# Patient Record
Sex: MALE | Race: WHITE | NOT HISPANIC OR LATINO | Employment: UNEMPLOYED | URBAN - METROPOLITAN AREA
[De-identification: names, ages, dates, MRNs, and addresses within clinical notes are randomized per-mention and may not be internally consistent; named-entity substitution may affect disease eponyms.]

---

## 2024-09-09 ENCOUNTER — HOSPITAL ENCOUNTER (INPATIENT)
Facility: HOSPITAL | Age: 79
LOS: 2 days | Discharge: HOME | End: 2024-09-11
Attending: STUDENT IN AN ORGANIZED HEALTH CARE EDUCATION/TRAINING PROGRAM | Admitting: INTERNAL MEDICINE
Payer: MEDICARE

## 2024-09-09 ENCOUNTER — APPOINTMENT (OUTPATIENT)
Dept: RADIOLOGY | Facility: HOSPITAL | Age: 79
End: 2024-09-09
Payer: MEDICARE

## 2024-09-09 DIAGNOSIS — N20.0 NEPHROLITHIASIS: Primary | ICD-10-CM

## 2024-09-09 DIAGNOSIS — N17.9 AKI (ACUTE KIDNEY INJURY) (CMS-HCC): ICD-10-CM

## 2024-09-09 DIAGNOSIS — N39.0 URINARY TRACT INFECTION WITHOUT HEMATURIA, SITE UNSPECIFIED: ICD-10-CM

## 2024-09-09 PROBLEM — N13.9 OBSTRUCTIVE UROPATHY: Status: ACTIVE | Noted: 2024-09-09

## 2024-09-09 PROBLEM — R31.9 HEMATURIA: Status: ACTIVE | Noted: 2024-09-09

## 2024-09-09 PROBLEM — I71.43 ANEURYSM OF INFRARENAL ABDOMINAL AORTA (CMS-HCC): Status: ACTIVE | Noted: 2024-09-09

## 2024-09-09 PROBLEM — E78.5 DYSLIPIDEMIA: Status: ACTIVE | Noted: 2024-09-09

## 2024-09-09 PROBLEM — N40.0 BPH (BENIGN PROSTATIC HYPERPLASIA): Status: ACTIVE | Noted: 2024-09-09

## 2024-09-09 PROBLEM — I10 HYPERTENSION: Status: ACTIVE | Noted: 2024-09-09

## 2024-09-09 LAB
ALBUMIN SERPL-MCNC: 3.5 G/DL (ref 3.5–5)
ALP BLD-CCNC: 116 U/L (ref 35–125)
ALT SERPL-CCNC: 18 U/L (ref 5–40)
ANION GAP SERPL CALC-SCNC: 9 MMOL/L
APPEARANCE UR: ABNORMAL
AST SERPL-CCNC: 19 U/L (ref 5–40)
BACTERIA #/AREA URNS AUTO: ABNORMAL /HPF
BASOPHILS # BLD AUTO: 0.06 X10*3/UL (ref 0–0.1)
BASOPHILS NFR BLD AUTO: 0.3 %
BILIRUB SERPL-MCNC: 1 MG/DL (ref 0.1–1.2)
BILIRUB UR STRIP.AUTO-MCNC: NEGATIVE MG/DL
BUN SERPL-MCNC: 24 MG/DL (ref 8–25)
CALCIUM SERPL-MCNC: 9.9 MG/DL (ref 8.5–10.4)
CHLORIDE SERPL-SCNC: 98 MMOL/L (ref 97–107)
CO2 SERPL-SCNC: 22 MMOL/L (ref 24–31)
COLOR UR: YELLOW
CREAT SERPL-MCNC: 1.9 MG/DL (ref 0.4–1.6)
EGFRCR SERPLBLD CKD-EPI 2021: 36 ML/MIN/1.73M*2
EOSINOPHIL # BLD AUTO: 0.09 X10*3/UL (ref 0–0.4)
EOSINOPHIL NFR BLD AUTO: 0.5 %
ERYTHROCYTE [DISTWIDTH] IN BLOOD BY AUTOMATED COUNT: 12.5 % (ref 11.5–14.5)
GLUCOSE SERPL-MCNC: 127 MG/DL (ref 65–99)
GLUCOSE UR STRIP.AUTO-MCNC: NORMAL MG/DL
HCT VFR BLD AUTO: 36 % (ref 41–52)
HGB BLD-MCNC: 12.3 G/DL (ref 13.5–17.5)
HOLD SPECIMEN: NORMAL
IMM GRANULOCYTES # BLD AUTO: 0.1 X10*3/UL (ref 0–0.5)
IMM GRANULOCYTES NFR BLD AUTO: 0.5 % (ref 0–0.9)
KETONES UR STRIP.AUTO-MCNC: NEGATIVE MG/DL
LEUKOCYTE ESTERASE UR QL STRIP.AUTO: ABNORMAL
LIPASE SERPL-CCNC: 17 U/L (ref 16–63)
LYMPHOCYTES # BLD AUTO: 1.61 X10*3/UL (ref 0.8–3)
LYMPHOCYTES NFR BLD AUTO: 8.8 %
MCH RBC QN AUTO: 31.5 PG (ref 26–34)
MCHC RBC AUTO-ENTMCNC: 34.2 G/DL (ref 32–36)
MCV RBC AUTO: 92 FL (ref 80–100)
MONOCYTES # BLD AUTO: 1.83 X10*3/UL (ref 0.05–0.8)
MONOCYTES NFR BLD AUTO: 10 %
MUCOUS THREADS #/AREA URNS AUTO: ABNORMAL /LPF
NEUTROPHILS # BLD AUTO: 14.62 X10*3/UL (ref 1.6–5.5)
NEUTROPHILS NFR BLD AUTO: 79.9 %
NITRITE UR QL STRIP.AUTO: ABNORMAL
NRBC BLD-RTO: 0 /100 WBCS (ref 0–0)
PH UR STRIP.AUTO: 6 [PH]
PLATELET # BLD AUTO: 263 X10*3/UL (ref 150–450)
POTASSIUM SERPL-SCNC: 4.3 MMOL/L (ref 3.4–5.1)
PROT SERPL-MCNC: 6.8 G/DL (ref 5.9–7.9)
PROT UR STRIP.AUTO-MCNC: ABNORMAL MG/DL
RBC # BLD AUTO: 3.9 X10*6/UL (ref 4.5–5.9)
RBC # UR STRIP.AUTO: ABNORMAL /UL
RBC #/AREA URNS AUTO: >20 /HPF
SODIUM SERPL-SCNC: 129 MMOL/L (ref 133–145)
SP GR UR STRIP.AUTO: 1.02
UROBILINOGEN UR STRIP.AUTO-MCNC: NORMAL MG/DL
WBC # BLD AUTO: 18.3 X10*3/UL (ref 4.4–11.3)
WBC #/AREA URNS AUTO: >50 /HPF
WBC CLUMPS #/AREA URNS AUTO: ABNORMAL /HPF

## 2024-09-09 PROCEDURE — 1210000001 HC SEMI-PRIVATE ROOM DAILY

## 2024-09-09 PROCEDURE — 36415 COLL VENOUS BLD VENIPUNCTURE: CPT | Performed by: STUDENT IN AN ORGANIZED HEALTH CARE EDUCATION/TRAINING PROGRAM

## 2024-09-09 PROCEDURE — 85025 COMPLETE CBC W/AUTO DIFF WBC: CPT | Performed by: STUDENT IN AN ORGANIZED HEALTH CARE EDUCATION/TRAINING PROGRAM

## 2024-09-09 PROCEDURE — 99285 EMERGENCY DEPT VISIT HI MDM: CPT

## 2024-09-09 PROCEDURE — 74176 CT ABD & PELVIS W/O CONTRAST: CPT | Performed by: RADIOLOGY

## 2024-09-09 PROCEDURE — 2500000004 HC RX 250 GENERAL PHARMACY W/ HCPCS (ALT 636 FOR OP/ED): Performed by: STUDENT IN AN ORGANIZED HEALTH CARE EDUCATION/TRAINING PROGRAM

## 2024-09-09 PROCEDURE — 96365 THER/PROPH/DIAG IV INF INIT: CPT

## 2024-09-09 PROCEDURE — 81001 URINALYSIS AUTO W/SCOPE: CPT | Performed by: STUDENT IN AN ORGANIZED HEALTH CARE EDUCATION/TRAINING PROGRAM

## 2024-09-09 PROCEDURE — 74176 CT ABD & PELVIS W/O CONTRAST: CPT

## 2024-09-09 PROCEDURE — 2500000002 HC RX 250 W HCPCS SELF ADMINISTERED DRUGS (ALT 637 FOR MEDICARE OP, ALT 636 FOR OP/ED): Performed by: INTERNAL MEDICINE

## 2024-09-09 PROCEDURE — 80053 COMPREHEN METABOLIC PANEL: CPT | Performed by: STUDENT IN AN ORGANIZED HEALTH CARE EDUCATION/TRAINING PROGRAM

## 2024-09-09 PROCEDURE — 2500000004 HC RX 250 GENERAL PHARMACY W/ HCPCS (ALT 636 FOR OP/ED): Performed by: INTERNAL MEDICINE

## 2024-09-09 PROCEDURE — 99222 1ST HOSP IP/OBS MODERATE 55: CPT | Performed by: INTERNAL MEDICINE

## 2024-09-09 PROCEDURE — 83690 ASSAY OF LIPASE: CPT | Performed by: STUDENT IN AN ORGANIZED HEALTH CARE EDUCATION/TRAINING PROGRAM

## 2024-09-09 PROCEDURE — 87086 URINE CULTURE/COLONY COUNT: CPT | Mod: WESLAB | Performed by: STUDENT IN AN ORGANIZED HEALTH CARE EDUCATION/TRAINING PROGRAM

## 2024-09-09 PROCEDURE — 2500000001 HC RX 250 WO HCPCS SELF ADMINISTERED DRUGS (ALT 637 FOR MEDICARE OP): Performed by: INTERNAL MEDICINE

## 2024-09-09 RX ORDER — ACETAMINOPHEN 160 MG/5ML
650 SOLUTION ORAL EVERY 4 HOURS PRN
Status: DISCONTINUED | OUTPATIENT
Start: 2024-09-09 | End: 2024-09-12 | Stop reason: HOSPADM

## 2024-09-09 RX ORDER — LEVOTHYROXINE SODIUM 112 UG/1
112 TABLET ORAL DAILY
Status: DISCONTINUED | OUTPATIENT
Start: 2024-09-10 | End: 2024-09-12 | Stop reason: HOSPADM

## 2024-09-09 RX ORDER — METOPROLOL SUCCINATE 50 MG/1
50 TABLET, EXTENDED RELEASE ORAL DAILY
Status: DISCONTINUED | OUTPATIENT
Start: 2024-09-09 | End: 2024-09-12 | Stop reason: HOSPADM

## 2024-09-09 RX ORDER — ATORVASTATIN CALCIUM 40 MG/1
40 TABLET, FILM COATED ORAL DAILY
COMMUNITY

## 2024-09-09 RX ORDER — CLOPIDOGREL BISULFATE 75 MG/1
75 TABLET ORAL DAILY
Status: DISCONTINUED | OUTPATIENT
Start: 2024-09-09 | End: 2024-09-12 | Stop reason: HOSPADM

## 2024-09-09 RX ORDER — LEVOTHYROXINE SODIUM 112 UG/1
112 TABLET ORAL DAILY
COMMUNITY

## 2024-09-09 RX ORDER — PANTOPRAZOLE SODIUM 40 MG/10ML
40 INJECTION, POWDER, LYOPHILIZED, FOR SOLUTION INTRAVENOUS DAILY
Status: DISCONTINUED | OUTPATIENT
Start: 2024-09-09 | End: 2024-09-12 | Stop reason: HOSPADM

## 2024-09-09 RX ORDER — CLOPIDOGREL BISULFATE 75 MG/1
75 TABLET ORAL DAILY
COMMUNITY

## 2024-09-09 RX ORDER — LISINOPRIL 5 MG/1
5 TABLET ORAL DAILY
Status: DISCONTINUED | OUTPATIENT
Start: 2024-09-09 | End: 2024-09-12 | Stop reason: HOSPADM

## 2024-09-09 RX ORDER — ACETAMINOPHEN 650 MG/1
650 SUPPOSITORY RECTAL EVERY 4 HOURS PRN
Status: DISCONTINUED | OUTPATIENT
Start: 2024-09-09 | End: 2024-09-12 | Stop reason: HOSPADM

## 2024-09-09 RX ORDER — NITROGLYCERIN 0.4 MG/1
0.4 TABLET SUBLINGUAL EVERY 5 MIN PRN
Status: DISCONTINUED | OUTPATIENT
Start: 2024-09-09 | End: 2024-09-12 | Stop reason: HOSPADM

## 2024-09-09 RX ORDER — LANOLIN ALCOHOL/MO/W.PET/CERES
1000 CREAM (GRAM) TOPICAL DAILY
COMMUNITY

## 2024-09-09 RX ORDER — DEXTROSE MONOHYDRATE, SODIUM CHLORIDE, AND POTASSIUM CHLORIDE 50; 1.49; 4.5 G/1000ML; G/1000ML; G/1000ML
100 INJECTION, SOLUTION INTRAVENOUS CONTINUOUS
Status: DISCONTINUED | OUTPATIENT
Start: 2024-09-09 | End: 2024-09-10

## 2024-09-09 RX ORDER — ONDANSETRON 4 MG/1
4 TABLET, FILM COATED ORAL EVERY 8 HOURS PRN
Status: DISCONTINUED | OUTPATIENT
Start: 2024-09-09 | End: 2024-09-12 | Stop reason: HOSPADM

## 2024-09-09 RX ORDER — LANOLIN ALCOHOL/MO/W.PET/CERES
1000 CREAM (GRAM) TOPICAL DAILY
Status: DISCONTINUED | OUTPATIENT
Start: 2024-09-09 | End: 2024-09-12 | Stop reason: HOSPADM

## 2024-09-09 RX ORDER — CEFTRIAXONE 1 G/50ML
1 INJECTION, SOLUTION INTRAVENOUS ONCE
Status: COMPLETED | OUTPATIENT
Start: 2024-09-09 | End: 2024-09-09

## 2024-09-09 RX ORDER — GLUCOSAM/CHONDRO/HERB 149/HYAL 750-100 MG
1 TABLET ORAL 2 TIMES DAILY
Status: DISCONTINUED | OUTPATIENT
Start: 2024-09-09 | End: 2024-09-09

## 2024-09-09 RX ORDER — ASPIRIN 81 MG/1
81 TABLET ORAL DAILY
Status: DISCONTINUED | OUTPATIENT
Start: 2024-09-09 | End: 2024-09-12 | Stop reason: HOSPADM

## 2024-09-09 RX ORDER — ATORVASTATIN CALCIUM 40 MG/1
40 TABLET, FILM COATED ORAL NIGHTLY
Status: DISCONTINUED | OUTPATIENT
Start: 2024-09-09 | End: 2024-09-12 | Stop reason: HOSPADM

## 2024-09-09 RX ORDER — ZINC GLUCONATE 50 MG
50 TABLET ORAL DAILY
COMMUNITY

## 2024-09-09 RX ORDER — TAMSULOSIN HYDROCHLORIDE 0.4 MG/1
0.4 CAPSULE ORAL DAILY
Status: DISCONTINUED | OUTPATIENT
Start: 2024-09-09 | End: 2024-09-12 | Stop reason: HOSPADM

## 2024-09-09 RX ORDER — GUAIFENESIN/DEXTROMETHORPHAN 100-10MG/5
5 SYRUP ORAL EVERY 4 HOURS PRN
Status: DISCONTINUED | OUTPATIENT
Start: 2024-09-09 | End: 2024-09-12 | Stop reason: HOSPADM

## 2024-09-09 RX ORDER — ASCORBIC ACID 500 MG
500 TABLET ORAL DAILY
Status: DISCONTINUED | OUTPATIENT
Start: 2024-09-09 | End: 2024-09-12 | Stop reason: HOSPADM

## 2024-09-09 RX ORDER — NITROGLYCERIN 0.4 MG/1
0.4 TABLET SUBLINGUAL EVERY 5 MIN PRN
COMMUNITY

## 2024-09-09 RX ORDER — METOPROLOL SUCCINATE 50 MG/1
50 TABLET, EXTENDED RELEASE ORAL DAILY
COMMUNITY

## 2024-09-09 RX ORDER — GLUCOSAM/CHONDRO/HERB 149/HYAL 750-100 MG
1 TABLET ORAL 2 TIMES DAILY
COMMUNITY

## 2024-09-09 RX ORDER — ACETAMINOPHEN 325 MG/1
650 TABLET ORAL EVERY 4 HOURS PRN
Status: DISCONTINUED | OUTPATIENT
Start: 2024-09-09 | End: 2024-09-12 | Stop reason: HOSPADM

## 2024-09-09 RX ORDER — DOCUSATE SODIUM 100 MG/1
100 CAPSULE, LIQUID FILLED ORAL 2 TIMES DAILY
Status: DISCONTINUED | OUTPATIENT
Start: 2024-09-09 | End: 2024-09-12 | Stop reason: HOSPADM

## 2024-09-09 RX ORDER — PANTOPRAZOLE SODIUM 40 MG/1
40 TABLET, DELAYED RELEASE ORAL DAILY
Status: DISCONTINUED | OUTPATIENT
Start: 2024-09-09 | End: 2024-09-12 | Stop reason: HOSPADM

## 2024-09-09 RX ORDER — ASPIRIN 81 MG/1
81 TABLET ORAL DAILY
COMMUNITY

## 2024-09-09 RX ORDER — RAMIPRIL 2.5 MG/1
2.5 CAPSULE ORAL DAILY
COMMUNITY
End: 2024-09-11 | Stop reason: HOSPADM

## 2024-09-09 RX ORDER — ONDANSETRON HYDROCHLORIDE 2 MG/ML
4 INJECTION, SOLUTION INTRAVENOUS EVERY 8 HOURS PRN
Status: DISCONTINUED | OUTPATIENT
Start: 2024-09-09 | End: 2024-09-12 | Stop reason: HOSPADM

## 2024-09-09 RX ORDER — GUAIFENESIN 600 MG/1
600 TABLET, EXTENDED RELEASE ORAL EVERY 12 HOURS PRN
Status: DISCONTINUED | OUTPATIENT
Start: 2024-09-09 | End: 2024-09-12 | Stop reason: HOSPADM

## 2024-09-09 RX ORDER — POLYETHYLENE GLYCOL 3350 17 G/17G
17 POWDER, FOR SOLUTION ORAL DAILY
Status: DISCONTINUED | OUTPATIENT
Start: 2024-09-09 | End: 2024-09-12 | Stop reason: HOSPADM

## 2024-09-09 RX ORDER — ASCORBIC ACID 500 MG
500 TABLET ORAL DAILY
COMMUNITY

## 2024-09-09 RX ORDER — ENOXAPARIN SODIUM 100 MG/ML
40 INJECTION SUBCUTANEOUS DAILY
Status: DISCONTINUED | OUTPATIENT
Start: 2024-09-09 | End: 2024-09-12 | Stop reason: HOSPADM

## 2024-09-09 RX ORDER — TALC
3 POWDER (GRAM) TOPICAL NIGHTLY PRN
Status: DISCONTINUED | OUTPATIENT
Start: 2024-09-09 | End: 2024-09-12 | Stop reason: HOSPADM

## 2024-09-09 SDOH — SOCIAL STABILITY: SOCIAL INSECURITY: DO YOU FEEL ANYONE HAS EXPLOITED OR TAKEN ADVANTAGE OF YOU FINANCIALLY OR OF YOUR PERSONAL PROPERTY?: NO

## 2024-09-09 SDOH — SOCIAL STABILITY: SOCIAL INSECURITY
WITHIN THE LAST YEAR, HAVE TO BEEN RAPED OR FORCED TO HAVE ANY KIND OF SEXUAL ACTIVITY BY YOUR PARTNER OR EX-PARTNER?: NO

## 2024-09-09 SDOH — ECONOMIC STABILITY: FOOD INSECURITY: WITHIN THE PAST 12 MONTHS, YOU WORRIED THAT YOUR FOOD WOULD RUN OUT BEFORE YOU GOT MONEY TO BUY MORE.: NEVER TRUE

## 2024-09-09 SDOH — HEALTH STABILITY: PHYSICAL HEALTH: ON AVERAGE, HOW MANY MINUTES DO YOU ENGAGE IN EXERCISE AT THIS LEVEL?: 0 MIN

## 2024-09-09 SDOH — SOCIAL STABILITY: SOCIAL INSECURITY: WITHIN THE LAST YEAR, HAVE YOU BEEN AFRAID OF YOUR PARTNER OR EX-PARTNER?: NO

## 2024-09-09 SDOH — ECONOMIC STABILITY: INCOME INSECURITY: IN THE LAST 12 MONTHS, WAS THERE A TIME WHEN YOU WERE NOT ABLE TO PAY THE MORTGAGE OR RENT ON TIME?: NO

## 2024-09-09 SDOH — HEALTH STABILITY: MENTAL HEALTH: HOW OFTEN DO YOU HAVE 6 OR MORE DRINKS ON ONE OCCASION?: NEVER

## 2024-09-09 SDOH — HEALTH STABILITY: MENTAL HEALTH
HOW OFTEN DO YOU NEED TO HAVE SOMEONE HELP YOU WHEN YOU READ INSTRUCTIONS, PAMPHLETS, OR OTHER WRITTEN MATERIAL FROM YOUR DOCTOR OR PHARMACY?: NEVER

## 2024-09-09 SDOH — HEALTH STABILITY: MENTAL HEALTH: HOW OFTEN DO YOU HAVE A DRINK CONTAINING ALCOHOL?: NEVER

## 2024-09-09 SDOH — SOCIAL STABILITY: SOCIAL NETWORK: ARE YOU MARRIED, WIDOWED, DIVORCED, SEPARATED, NEVER MARRIED, OR LIVING WITH A PARTNER?: MARRIED

## 2024-09-09 SDOH — ECONOMIC STABILITY: TRANSPORTATION INSECURITY
IN THE PAST 12 MONTHS, HAS LACK OF TRANSPORTATION KEPT YOU FROM MEETINGS, WORK, OR FROM GETTING THINGS NEEDED FOR DAILY LIVING?: NO

## 2024-09-09 SDOH — ECONOMIC STABILITY: HOUSING INSECURITY: AT ANY TIME IN THE PAST 12 MONTHS, WERE YOU HOMELESS OR LIVING IN A SHELTER (INCLUDING NOW)?: NO

## 2024-09-09 SDOH — SOCIAL STABILITY: SOCIAL INSECURITY
WITHIN THE LAST YEAR, HAVE YOU BEEN KICKED, HIT, SLAPPED, OR OTHERWISE PHYSICALLY HURT BY YOUR PARTNER OR EX-PARTNER?: NO

## 2024-09-09 SDOH — SOCIAL STABILITY: SOCIAL INSECURITY: WITHIN THE LAST YEAR, HAVE YOU BEEN HUMILIATED OR EMOTIONALLY ABUSED IN OTHER WAYS BY YOUR PARTNER OR EX-PARTNER?: NO

## 2024-09-09 SDOH — SOCIAL STABILITY: SOCIAL INSECURITY: WERE YOU ABLE TO COMPLETE ALL THE BEHAVIORAL HEALTH SCREENINGS?: YES

## 2024-09-09 SDOH — SOCIAL STABILITY: SOCIAL NETWORK
IN A TYPICAL WEEK, HOW MANY TIMES DO YOU TALK ON THE PHONE WITH FAMILY, FRIENDS, OR NEIGHBORS?: MORE THAN THREE TIMES A WEEK

## 2024-09-09 SDOH — HEALTH STABILITY: MENTAL HEALTH
STRESS IS WHEN SOMEONE FEELS TENSE, NERVOUS, ANXIOUS, OR CAN'T SLEEP AT NIGHT BECAUSE THEIR MIND IS TROUBLED. HOW STRESSED ARE YOU?: NOT AT ALL

## 2024-09-09 SDOH — SOCIAL STABILITY: SOCIAL INSECURITY: DO YOU FEEL UNSAFE GOING BACK TO THE PLACE WHERE YOU ARE LIVING?: NO

## 2024-09-09 SDOH — SOCIAL STABILITY: SOCIAL INSECURITY: ABUSE: ADULT

## 2024-09-09 SDOH — SOCIAL STABILITY: SOCIAL NETWORK
DO YOU BELONG TO ANY CLUBS OR ORGANIZATIONS SUCH AS CHURCH GROUPS UNIONS, FRATERNAL OR ATHLETIC GROUPS, OR SCHOOL GROUPS?: NO

## 2024-09-09 SDOH — ECONOMIC STABILITY: FOOD INSECURITY: WITHIN THE PAST 12 MONTHS, THE FOOD YOU BOUGHT JUST DIDN'T LAST AND YOU DIDN'T HAVE MONEY TO GET MORE.: NEVER TRUE

## 2024-09-09 SDOH — SOCIAL STABILITY: SOCIAL INSECURITY: ARE THERE ANY APPARENT SIGNS OF INJURIES/BEHAVIORS THAT COULD BE RELATED TO ABUSE/NEGLECT?: NO

## 2024-09-09 SDOH — SOCIAL STABILITY: SOCIAL INSECURITY: ARE YOU OR HAVE YOU BEEN THREATENED OR ABUSED PHYSICALLY, EMOTIONALLY, OR SEXUALLY BY ANYONE?: NO

## 2024-09-09 SDOH — SOCIAL STABILITY: SOCIAL NETWORK: HOW OFTEN DO YOU ATTENT MEETINGS OF THE CLUB OR ORGANIZATION YOU BELONG TO?: NEVER

## 2024-09-09 SDOH — ECONOMIC STABILITY: INCOME INSECURITY: IN THE PAST 12 MONTHS, HAS THE ELECTRIC, GAS, OIL, OR WATER COMPANY THREATENED TO SHUT OFF SERVICE IN YOUR HOME?: NO

## 2024-09-09 SDOH — HEALTH STABILITY: PHYSICAL HEALTH: ON AVERAGE, HOW MANY DAYS PER WEEK DO YOU ENGAGE IN MODERATE TO STRENUOUS EXERCISE (LIKE A BRISK WALK)?: 0 DAYS

## 2024-09-09 SDOH — HEALTH STABILITY: MENTAL HEALTH: HOW MANY STANDARD DRINKS CONTAINING ALCOHOL DO YOU HAVE ON A TYPICAL DAY?: PATIENT DOES NOT DRINK

## 2024-09-09 SDOH — ECONOMIC STABILITY: INCOME INSECURITY: HOW HARD IS IT FOR YOU TO PAY FOR THE VERY BASICS LIKE FOOD, HOUSING, MEDICAL CARE, AND HEATING?: NOT VERY HARD

## 2024-09-09 SDOH — SOCIAL STABILITY: SOCIAL INSECURITY: HAVE YOU HAD THOUGHTS OF HARMING ANYONE ELSE?: NO

## 2024-09-09 SDOH — ECONOMIC STABILITY: TRANSPORTATION INSECURITY
IN THE PAST 12 MONTHS, HAS THE LACK OF TRANSPORTATION KEPT YOU FROM MEDICAL APPOINTMENTS OR FROM GETTING MEDICATIONS?: NO

## 2024-09-09 SDOH — SOCIAL STABILITY: SOCIAL INSECURITY: DOES ANYONE TRY TO KEEP YOU FROM HAVING/CONTACTING OTHER FRIENDS OR DOING THINGS OUTSIDE YOUR HOME?: NO

## 2024-09-09 SDOH — SOCIAL STABILITY: SOCIAL NETWORK: HOW OFTEN DO YOU GET TOGETHER WITH FRIENDS OR RELATIVES?: TWICE A WEEK

## 2024-09-09 SDOH — SOCIAL STABILITY: SOCIAL INSECURITY: HAVE YOU HAD ANY THOUGHTS OF HARMING ANYONE ELSE?: NO

## 2024-09-09 SDOH — ECONOMIC STABILITY: HOUSING INSECURITY: IN THE PAST 12 MONTHS, HOW MANY TIMES HAVE YOU MOVED WHERE YOU WERE LIVING?: 0

## 2024-09-09 SDOH — SOCIAL STABILITY: SOCIAL INSECURITY: HAS ANYONE EVER THREATENED TO HURT YOUR FAMILY OR YOUR PETS?: NO

## 2024-09-09 SDOH — SOCIAL STABILITY: SOCIAL NETWORK: HOW OFTEN DO YOU ATTEND CHURCH OR RELIGIOUS SERVICES?: NEVER

## 2024-09-09 ASSESSMENT — ENCOUNTER SYMPTOMS
SHORTNESS OF BREATH: 0
TREMORS: 0
NAUSEA: 0
CHEST TIGHTNESS: 0
DIAPHORESIS: 0
EYE DISCHARGE: 0
APPETITE CHANGE: 0
ABDOMINAL PAIN: 1
BLOOD IN STOOL: 0
WHEEZING: 0
CONSTIPATION: 0
STRIDOR: 0
PHOTOPHOBIA: 0
FREQUENCY: 0
NUMBNESS: 0
ARTHRALGIAS: 0
FACIAL ASYMMETRY: 0
FATIGUE: 0
COUGH: 0
CHILLS: 0
EYE ITCHING: 0
AGITATION: 0
DECREASED CONCENTRATION: 0
BRUISES/BLEEDS EASILY: 0
FEVER: 0
HEMATURIA: 1
UNEXPECTED WEIGHT CHANGE: 0
NERVOUS/ANXIOUS: 0
VOMITING: 0
HALLUCINATIONS: 0
DIARRHEA: 0
SPEECH DIFFICULTY: 0
EYE REDNESS: 0
WOUND: 0
APNEA: 0
CONFUSION: 0
LIGHT-HEADEDNESS: 0
VOICE CHANGE: 0
HYPERACTIVE: 0
POLYPHAGIA: 0
DIZZINESS: 0
COLOR CHANGE: 0
ANAL BLEEDING: 0
PALPITATIONS: 0
CHOKING: 0
SINUS PRESSURE: 0
DYSURIA: 0
FACIAL SWELLING: 0
NECK STIFFNESS: 0
SLEEP DISTURBANCE: 0
RECTAL PAIN: 0
TROUBLE SWALLOWING: 0
SORE THROAT: 0
JOINT SWELLING: 0
BACK PAIN: 0
EYE PAIN: 0
WEAKNESS: 0
POLYDIPSIA: 0
ABDOMINAL DISTENTION: 0
HEADACHES: 0
SINUS PAIN: 0
ADENOPATHY: 0
MYALGIAS: 0
FLANK PAIN: 0
NECK PAIN: 0
DIFFICULTY URINATING: 0
DYSPHORIC MOOD: 0
RHINORRHEA: 0
ACTIVITY CHANGE: 0
SEIZURES: 0

## 2024-09-09 ASSESSMENT — PAIN - FUNCTIONAL ASSESSMENT
PAIN_FUNCTIONAL_ASSESSMENT: 0-10
PAIN_FUNCTIONAL_ASSESSMENT: 0-10

## 2024-09-09 ASSESSMENT — COGNITIVE AND FUNCTIONAL STATUS - GENERAL
DAILY ACTIVITIY SCORE: 24
PATIENT BASELINE BEDBOUND: NO
MOBILITY SCORE: 24

## 2024-09-09 ASSESSMENT — LIFESTYLE VARIABLES
HOW OFTEN DO YOU HAVE 6 OR MORE DRINKS ON ONE OCCASION: NEVER
HOW OFTEN DO YOU HAVE A DRINK CONTAINING ALCOHOL: NEVER
AUDIT-C TOTAL SCORE: 0
SKIP TO QUESTIONS 9-10: 1
EVER FELT BAD OR GUILTY ABOUT YOUR DRINKING: NO
AUDIT-C TOTAL SCORE: 0
AUDIT-C TOTAL SCORE: 0
PRESCIPTION_ABUSE_PAST_12_MONTHS: NO
HOW MANY STANDARD DRINKS CONTAINING ALCOHOL DO YOU HAVE ON A TYPICAL DAY: PATIENT DOES NOT DRINK
SUBSTANCE_ABUSE_PAST_12_MONTHS: NO
EVER HAD A DRINK FIRST THING IN THE MORNING TO STEADY YOUR NERVES TO GET RID OF A HANGOVER: NO
HAVE YOU EVER FELT YOU SHOULD CUT DOWN ON YOUR DRINKING: NO
SKIP TO QUESTIONS 9-10: 1
HAVE PEOPLE ANNOYED YOU BY CRITICIZING YOUR DRINKING: NO
TOTAL SCORE: 0

## 2024-09-09 ASSESSMENT — ACTIVITIES OF DAILY LIVING (ADL)
HEARING - RIGHT EAR: FUNCTIONAL
WALKS IN HOME: INDEPENDENT
BATHING: INDEPENDENT
FEEDING YOURSELF: INDEPENDENT
ADEQUATE_TO_COMPLETE_ADL: YES
DRESSING YOURSELF: INDEPENDENT
JUDGMENT_ADEQUATE_SAFELY_COMPLETE_DAILY_ACTIVITIES: YES
GROOMING: INDEPENDENT
HEARING - LEFT EAR: FUNCTIONAL
PATIENT'S MEMORY ADEQUATE TO SAFELY COMPLETE DAILY ACTIVITIES?: YES
TOILETING: INDEPENDENT

## 2024-09-09 ASSESSMENT — PAIN SCALES - GENERAL
PAINLEVEL_OUTOF10: 0 - NO PAIN
PAINLEVEL_OUTOF10: 3

## 2024-09-09 ASSESSMENT — PATIENT HEALTH QUESTIONNAIRE - PHQ9
1. LITTLE INTEREST OR PLEASURE IN DOING THINGS: NOT AT ALL
2. FEELING DOWN, DEPRESSED OR HOPELESS: NOT AT ALL
SUM OF ALL RESPONSES TO PHQ9 QUESTIONS 1 & 2: 0

## 2024-09-09 ASSESSMENT — PAIN DESCRIPTION - LOCATION: LOCATION: BACK

## 2024-09-09 ASSESSMENT — PAIN DESCRIPTION - ORIENTATION: ORIENTATION: RIGHT;LEFT;LOWER

## 2024-09-09 ASSESSMENT — COLUMBIA-SUICIDE SEVERITY RATING SCALE - C-SSRS
6. HAVE YOU EVER DONE ANYTHING, STARTED TO DO ANYTHING, OR PREPARED TO DO ANYTHING TO END YOUR LIFE?: NO
1. IN THE PAST MONTH, HAVE YOU WISHED YOU WERE DEAD OR WISHED YOU COULD GO TO SLEEP AND NOT WAKE UP?: NO
2. HAVE YOU ACTUALLY HAD ANY THOUGHTS OF KILLING YOURSELF?: NO

## 2024-09-09 ASSESSMENT — PAIN DESCRIPTION - PAIN TYPE: TYPE: ACUTE PAIN

## 2024-09-09 NOTE — ED TRIAGE NOTES
Pt c/o flank pain that started around Saturday that sometimes radiates to his abdomen. Pt stated he woke up this morning with hematuria and what appeared to be a clot in his urine as well.

## 2024-09-09 NOTE — ED PROVIDER NOTES
HPI   Chief Complaint   Patient presents with    Flank Pain       HPI  See [facility]      Patient History   No past medical history on file.  No past surgical history on file.  No family history on file.  Social History     Tobacco Use    Smoking status: Not on file    Smokeless tobacco: Not on file   Substance Use Topics    Alcohol use: Not on file    Drug use: Not on file       Physical Exam   ED Triage Vitals [09/09/24 0925]   Temperature Heart Rate Respirations BP   36.6 °C (97.9 °F) 82 16 114/62      Pulse Ox Temp Source Heart Rate Source Patient Position   98 % Temporal Monitor Sitting      BP Location FiO2 (%)     Left arm --       Physical Exam  See [facility]    ED Course & [facility]   ED Course as of 09/09/24 1054   Mon Sep 09, 2024   1027 June 7, 2024 creatinine was 1.1. [DH]      ED Course User Index  [DH] Merlin Friedman MD         Diagnoses as of 09/09/24 1054   Nephrolithiasis   Urinary tract infection without hematuria, site unspecified   HERRERA (acute kidney injury) (CMS-[facility])                 No data recorded     [place] Coma Scale Score: 15 (09/09/24 0930 : Raquel Glasgow RN)                           Medical Decision Making  78-year-old male with past medical history of hypertension, hyperlipidemia, ACS who presents emergency room with flank pain.  Patient notes bilateral flank pain has been going on for last several days.  Patient did note hematuria as well as a blood clot when urinating today but is still making urine.  He otherwise denies any fevers, chills, nausea, vomiting, chest pain, shortness of breath, abdominal pain, diarrhea or constipation    ED Triage Vitals [09/09/24 0925]   Temperature Heart Rate Respirations BP   36.6 °C (97.9 °F) 82 16 114/62      Pulse Ox Temp Source Heart Rate Source Patient Position   98 % Temporal Monitor Sitting      BP Location FiO2 (%)     Left arm --       Vital signs reviewed: Afebrile, nontachycardic, normotensive, 98% on room air    Exam     Constitutional: No acute distress.  Resting comfortably.   Head: Normocephalic, atraumatic.   Eyes: Pupils equal bilaterally, EOM grossly intact, conjunctiva normal.  Mouth/Throat: Oropharynx is clear, moist mucus membranes.   Neck: Supple. No lymphadenopathy.  Cardiovascular: Regular rate and regular rhythm. Extremities are well-perfused.   Pulmonary/Chest: No respiratory distress, breathing comfortably on room air.    Abdominal: Soft, non-tender, non-distended. No rebound or guarding.   Musculoskeletal: No lower extremity edema.     No CVA tenderness.  Skin: Warm, dry, and intact.   Neurological: Patient is oriented to person, place, time, and situation. Face symmetric, hearing intact to voice, speech normal. Moves all extremities.       Differential includes but is not limited to:  UTI versus pyelonephritis versus nephrolithiasis versus pancreatitis      Labs: ordered.  Labs Reviewed   CBC WITH AUTO DIFFERENTIAL - Abnormal       Result Value    WBC 18.3 (*)     nRBC 0.0      RBC 3.90 (*)     Hemoglobin 12.3 (*)     Hematocrit 36.0 (*)     MCV 92      MCH 31.5      MCHC 34.2      RDW 12.5      Platelets 263      Neutrophils % 79.9      Immature Granulocytes %, Automated 0.5      Lymphocytes % 8.8      Monocytes % 10.0      Eosinophils % 0.5      Basophils % 0.3      Neutrophils Absolute 14.62 (*)     Immature Granulocytes Absolute, Automated 0.10      Lymphocytes Absolute 1.61      Monocytes Absolute 1.83 (*)     Eosinophils Absolute 0.09      Basophils Absolute 0.06     URINALYSIS WITH REFLEX CULTURE AND MICROSCOPIC - Abnormal    Color, Urine Yellow      Appearance, Urine Turbid (*)     Specific Gravity, Urine 1.016      pH, Urine 6.0      Protein, Urine 30 (1+) (*)     Glucose, Urine Normal      Blood, Urine 1.0 (3+) (*)     Ketones, Urine NEGATIVE      Bilirubin, Urine NEGATIVE      Urobilinogen, Urine Normal      Nitrite, Urine 1+ (*)     Leukocyte Esterase, Urine 500 Marion/µL (*)    COMPREHENSIVE METABOLIC PANEL - Abnormal    Glucose 127 (*)      Sodium 129 (*)     Potassium 4.3      Chloride 98      Bicarbonate 22 (*)     Urea Nitrogen 24      Creatinine 1.90 (*)     eGFR 36 (*)     Calcium 9.9      Albumin 3.5      Alkaline Phosphatase 116      Total Protein 6.8      AST 19      Bilirubin, Total 1.0      ALT 18      Anion Gap 9     MICROSCOPIC ONLY, URINE - Abnormal    WBC, Urine >50 (*)     WBC Clumps, Urine MODERATE      RBC, Urine >20 (*)     Bacteria, Urine 1+ (*)     Mucus, Urine FEW     LIPASE - Normal    Lipase 17     URINE CULTURE   URINALYSIS WITH REFLEX CULTURE AND MICROSCOPIC    Narrative:     The following orders were created for panel order Urinalysis with Reflex Culture and Microscopic.  Procedure                               Abnormality         Status                     ---------                               -----------         ------                     Urinalysis with Reflex C...[[id number]]  Abnormal            Final result               Extra Urine Gray Tube[[id number]]                            In process                   Please view results for these tests on the individual orders.   EXTRA URINE GRAY TUBE       Radiology: ordered and independent interpretation performed.  CT Scan(s) CT abdomen pelvis is interpreted by me shows no large dilated loops of bowel distress bowel obstruction    ECG/medicine tests: ordered and independent interpretation performed.  ED Course as of 09/09/24 1054   Mon Sep 09, 2024   1027 June 7, 2024 creatinine was 1.1. [DH]      ED Course User Index  [DH] Merlin Friedman MD         Diagnoses as of 09/09/24 1054   Nephrolithiasis   Urinary tract infection without hematuria, site unspecified   HERRERA (acute kidney injury) (CMS-[facility])     Lab work as interpreted by me shows no significant anemia but does show leukocytosis.  CMP otherwise shows mild hyponatremia and HERRERA.  Creatinine today is 1.9 and June 7 this year was 1.1.  UA also demonstrates evidence of UTI, will treat with Rocephin at this time.  Patient is  otherwise hemodynamically stable and otherwise not tachycardic and nontoxic-appearing.  CT abdomen pelvis does demonstrate possible right-sided kidney stone with mild to moderate hydronephrosis.  Patient to be admitted for treatment of UTI and nephrolithiasis and seen by urology.    Discussion of management or test interpretation with external provider(s):  I personally discussed the patient's management with Dr. Rodriguez, hospitalist, who will admit patient at this time for further management.    ED Medications managed:    Medications   cefTRIAXone (Rocephin) 1 g in dextrose (iso) IV 50 mL (has no administration in time range)           Merlin Friedman MD  10:54 AM    Attending Emergency Physician  [facility] EMERGENCY MEDICINE            Procedure  Procedures     Merlin Friedman MD  09/09/24 6453

## 2024-09-09 NOTE — ED NOTES
Attempted to give pt his medications, states he needs to eat with them. Menu given to pt to order a tray and will try again when food comes.      Deanna العراقي RN  09/09/24 6524

## 2024-09-09 NOTE — PROGRESS NOTES
Pharmacy Medication History Review    Jaswinder Keenan is a 78 y.o. male. Pharmacy reviewed the patient's goizj-tw-[id number] medications and allergies for accuracy.    Medications ADDED:  Vitamin C 500mg  Aspirin 81mg  Atorvastatin 40mg  Clopidogrel 75mg  Vitamin B-12 1000mcg  Fish oil 1000  Levothyroxine 112mcg  Nitroglycerin 0.4  Ramipril 2.5mg  Zinc 50  Metoprolol  succinate 50mg  Preservision AREDS  Medications CHANGED:  none  Medications REMOVED:   None      The list below reflects the updated PTA list. Comments regarding how patient may be taking medications differently can be found in the Admit Orders Activity  Prior to Admission Medications   Prescriptions Last Dose Informant   ascorbic acid (Vitamin C) 500 mg tablet 9/8/2024 at aspirin Self   Sig: Take 1 tablet (500 mg) by mouth once daily.   aspirin 81 mg EC tablet 9/8/2024 Self   Sig: Take 1 tablet (81 mg) by mouth once daily.   atorvastatin (Lipitor) 40 mg tablet 9/8/2024 Self   Sig: Take 1 tablet (40 mg) by mouth once daily.   clopidogrel (Plavix) 75 mg tablet  Self   Sig: Take 1 tablet (75 mg) by mouth once daily.   cyanocobalamin (Vitamin B-12) 1,000 mcg tablet 9/8/2024 Self   Sig: Take 1 tablet (1,000 mcg) by mouth once daily.   levothyroxine (Synthroid, Levoxyl) 112 mcg tablet 9/9/2024 Self   Sig: Take 1 tablet (112 mcg) by mouth early in the morning.. Take on an empty stomach at the same time each day, either 30 to 60 minutes prior to breakfast   metoprolol succinate XL (Toprol-XL) 50 mg 24 hr tablet 9/8/2024 Self   Sig: Take 1 tablet (50 mg) by mouth once daily. Do not crush or chew.   nitroglycerin (Nitrostat) 0.4 mg SL tablet Unknown Self   Sig: Place 1 tablet (0.4 mg) under the tongue every 5 minutes if needed for chest pain.   omega 3-dha-epa-fish oil (Fish OiL) 1,000 mg (120 mg-180 mg) capsule 9/8/2024 Self   Sig: Take 1 capsule (1,000 mg) by mouth 2 times a day.   ramipril (Altace) 2.5 mg capsule 9/8/2024 Self   Sig: Take 1 capsule (2.5 mg)  by mouth once daily.   vit A/vit C/vit E/zinc/copper (PRESERVISION AREDS ORAL) 9/8/2024 Self   Sig: Take 1 each by mouth 2 times a day.   zinc gluconate 50 mg tablet 9/8/2024 Self   Sig: Take 1 tablet (50 mg of elemental zinc) by mouth once daily.      Facility-Administered Medications: None        The list below reflects the updated allergy list. Please review each documented allergy for additional clarification and justification.  Allergies  Reviewed by Joslyn Rios CPhT on 9/9/2024        Severity Reactions Comments    Penicillins Not Specified Hiv             [facility]    Sources used to complete the med history include historical progress notes, patient interview. Patient is a good historian.    Below are additional concerns with the patient's PTA list.  None     Joslyn Rios CPhT-Adv  Please reach out via [facility] Secure Chat for questions

## 2024-09-09 NOTE — CONSULTS
"Reason For Consult  Right renal calculus and UTI    History Of Present Illness  Jaswinder Keenan is a 78 y.o. male presenting with a 2 day history of pain in his back that radiates to his abdomen.  He has no prior history of stones.  This morning he noted some gross hematuria and a small clot.  He came to the ER where a CT scan showed a 14 mm stone in the right UPJ with moderate hydronephrosis.  His WBC was elevated at 18.3 with a left shift.  His UA showed 1+ bacteria and > 50 WBCs.  He has cultures pending and Ceftriaxone has been started.     Past Medical History  He has no past medical history on file.    Surgical History  He has no past surgical history on file.     Social History  He reports that he has been smoking cigarettes. He has a 50 pack-year smoking history. He has never used smokeless tobacco. No history on file for alcohol use and drug use.    Family History  No family history on file.     Allergies  Penicillins    Review of Systems  As per admission HPA     Physical Exam  Awake, alert and oriented.  Normal respiratory pattern  HEENT:  normal extraocular movements  Neck:  Supple  Abd:  Soft, non tender.  Extremities:  Moves all 4     Last Recorded Vitals  Blood pressure 122/57, pulse 73, temperature 36.6 °C (97.9 °F), temperature source Temporal, resp. rate 17, height 1.727 m (5' 8\"), weight 97.5 kg (215 lb), SpO2 95%.    Relevant Results      Scheduled medications  ascorbic acid, 500 mg, oral, Daily  aspirin, 81 mg, oral, Daily  atorvastatin, 40 mg, oral, Nightly  clopidogrel, 75 mg, oral, Daily  cyanocobalamin, 1,000 mcg, oral, Daily  docusate sodium, 100 mg, oral, BID  enoxaparin, 40 mg, subcutaneous, Daily  [START ON 9/10/2024] levothyroxine, 112 mcg, oral, Daily  lisinopril, 5 mg, oral, Daily  metoprolol succinate XL, 50 mg, oral, Daily  pantoprazole, 40 mg, oral, Daily   Or  pantoprazole, 40 mg, intravenous, Daily  polyethylene glycol, 17 g, oral, Daily  tamsulosin, 0.4 mg, oral, " Daily      Continuous medications  potassium vvuvzlw-T7-3.45%NaCl, 100 mL/hr      PRN medications  PRN medications: acetaminophen **OR** acetaminophen **OR** acetaminophen, benzocaine-menthol, dextromethorphan-guaifenesin, guaiFENesin, melatonin, nitroglycerin, ondansetron **OR** ondansetron  Results for orders placed or performed during the hospital encounter of 09/09/24 (from the past 24 hour(s))   CBC and Auto Differential   Result Value Ref Range    WBC 18.3 (H) 4.4 - 11.3 x10*3/uL    nRBC 0.0 0.0 - 0.0 /100 WBCs    RBC 3.90 (L) 4.50 - 5.90 x10*6/uL    Hemoglobin 12.3 (L) 13.5 - 17.5 g/dL    Hematocrit 36.0 (L) 41.0 - 52.0 %    MCV 92 80 - 100 fL    MCH 31.5 26.0 - 34.0 pg    MCHC 34.2 32.0 - 36.0 g/dL    RDW 12.5 11.5 - 14.5 %    Platelets 263 150 - 450 x10*3/uL    Neutrophils % 79.9 40.0 - 80.0 %    Immature Granulocytes %, Automated 0.5 0.0 - 0.9 %    Lymphocytes % 8.8 13.0 - 44.0 %    Monocytes % 10.0 2.0 - 10.0 %    Eosinophils % 0.5 0.0 - 6.0 %    Basophils % 0.3 0.0 - 2.0 %    Neutrophils Absolute 14.62 (H) 1.60 - 5.50 x10*3/uL    Immature Granulocytes Absolute, Automated 0.10 0.00 - 0.50 x10*3/uL    Lymphocytes Absolute 1.61 0.80 - 3.00 x10*3/uL    Monocytes Absolute 1.83 (H) 0.05 - 0.80 x10*3/uL    Eosinophils Absolute 0.09 0.00 - 0.40 x10*3/uL    Basophils Absolute 0.06 0.00 - 0.10 x10*3/uL   Urinalysis with Reflex Culture and Microscopic   Result Value Ref Range    Color, Urine Yellow Light-Yellow, Yellow, Dark-Yellow    Appearance, Urine Turbid (N) Clear    Specific Gravity, Urine 1.016 1.005 - 1.035    pH, Urine 6.0 5.0, 5.5, 6.0, 6.5, 7.0, 7.5, 8.0    Protein, Urine 30 (1+) (A) NEGATIVE, 10 (TRACE), 20 (TRACE) mg/dL    Glucose, Urine Normal Normal mg/dL    Blood, Urine 1.0 (3+) (A) NEGATIVE    Ketones, Urine NEGATIVE NEGATIVE mg/dL    Bilirubin, Urine NEGATIVE NEGATIVE    Urobilinogen, Urine Normal Normal mg/dL    Nitrite, Urine 1+ (A) NEGATIVE    Leukocyte Esterase, Urine 500 Marion/µL (A) NEGATIVE    Comprehensive metabolic panel   Result Value Ref Range    Glucose 127 (H) 65 - 99 mg/dL    Sodium 129 (L) 133 - 145 mmol/L    Potassium 4.3 3.4 - 5.1 mmol/L    Chloride 98 97 - 107 mmol/L    Bicarbonate 22 (L) 24 - 31 mmol/L    Urea Nitrogen 24 8 - 25 mg/dL    Creatinine 1.90 (H) 0.40 - 1.60 mg/dL    eGFR 36 (L) >60 mL/min/1.73m*2    Calcium 9.9 8.5 - 10.4 mg/dL    Albumin 3.5 3.5 - 5.0 g/dL    Alkaline Phosphatase 116 35 - 125 U/L    Total Protein 6.8 5.9 - 7.9 g/dL    AST 19 5 - 40 U/L    Bilirubin, Total 1.0 0.1 - 1.2 mg/dL    ALT 18 5 - 40 U/L    Anion Gap 9 <=19 mmol/L   Lipase   Result Value Ref Range    Lipase 17 16 - 63 U/L   Microscopic Only, Urine   Result Value Ref Range    WBC, Urine >50 (A) 1-5, NONE /HPF    WBC Clumps, Urine MODERATE Reference range not established. /HPF    RBC, Urine >20 (A) NONE, 1-2, 3-5 /HPF    Bacteria, Urine 1+ (A) NONE SEEN /HPF    Mucus, Urine FEW Reference range not established. /LPF        Assessment/Plan     Right UPJ Stone: This is an obstructing stone as seen by the hydronephrosis.  It is 14 mm and will not passs on it's own.    UTI:  He has significant pyuria and an elevated WBC with a left shift.    Given the above, he needs to have the kidney decompressed and be given abx.  Ceftriaxone has been started and will place a stent tomorrow.  Await final culture results for further abx treatment.  Eventual ESWL treatment    Kevin Peguero MD

## 2024-09-09 NOTE — PROGRESS NOTES
Jaswinder Keenan is a 78 y.o. male on day 0 of admission presenting with Nephrolithiasis.       09/09/24 1619   ACS Disability Status   Are you deaf or do you have serious difficulty hearing? N   Are you blind or do you have serious difficulty seeing, even when wearing glasses? N   Because of a physical, mental, or emotional condition, do you have serious difficulty concentrating, remembering, or making decisions? (5 years old or older) N   Do you have serious difficulty walking or climbing stairs? N   Do you have serious difficulty dressing or bathing? N   Because of a physical, mental, or emotional condition, do you have serious difficulty doing errands alone such as visiting the doctor? N         Amelia Siegel RN

## 2024-09-09 NOTE — PROGRESS NOTES
Jaswinder Keenan is a 78 y.o. male on day 0 of admission presenting with Nephrolithiasis.       09/09/24 1618   Discharge Planning   Living Arrangements Spouse/significant other   Support Systems Spouse/significant other;Children   Assistance Needed none   Type of Residence Private residence   Number of Stairs to Enter Residence 1   Number of Stairs Within Residence 2  (flight to basement, flight to upper level)   Do you have animals or pets at home? No   Who is requesting discharge planning? Provider   Home or Post Acute Services None   Expected Discharge Disposition Home   Does the patient need discharge transport arranged? No   Patient Choice   Provider Choice list and CMS website (https://medicare.gov/care-compare#search) for post-acute Quality and Resource Measure Data were provided and reviewed with: Other (Comment)  (no skilled needs)   Patient / Family choosing to utilize agency / facility established prior to hospitalization No         Amelia Siegel RN

## 2024-09-09 NOTE — PROGRESS NOTES
Jaswinder Keenan is a 78 y.o. male on day 0 of admission presenting with Nephrolithiasis.       09/09/24 1620   Current Planned Discharge Disposition   Current Planned Discharge Disposition Home     Patient lives with his wife in a three level house, basement, main, and upper level. No use of assistive devices, no issues using the stairs. He is independent with ADLs, daily tasks and drives. He smokes 8 cigarettes/day, no alcohol x 45 years. PCP is Jessy Hager CNP.  ADOD 09/10/2024  Plan is to discharge home with no needs, family will transport.    Amelia Siegel RN

## 2024-09-09 NOTE — PROGRESS NOTES
Jaswinder Keenan is a 78 y.o. male on day 0 of admission presenting with Nephrolithiasis.       09/09/24 1616   Physical Activity   On average, how many days per week do you engage in moderate to strenuous exercise (like a brisk walk)? 0 days   On average, how many minutes do you engage in exercise at this level? 0 min   Financial Resource Strain   How hard is it for you to pay for the very basics like food, housing, medical care, and heating? Not very   Housing Stability   In the last 12 months, was there a time when you were not able to pay the mortgage or rent on time? N   In the past 12 months, how many times have you moved where you were living? 0   At any time in the past 12 months, were you homeless or living in a shelter (including now)? N   Transportation Needs   In the past 12 months, has lack of transportation kept you from medical appointments or from getting medications? no   In the past 12 months, has lack of transportation kept you from meetings, work, or from getting things needed for daily living? No   Food Insecurity   Within the past 12 months, you worried that your food would run out before you got the money to buy more. Never true   Within the past 12 months, the food you bought just didn't last and you didn't have money to get more. Never true   Stress   Do you feel stress - tense, restless, nervous, or anxious, or unable to sleep at night because your mind is troubled all the time - these days? Not at all   Social Connections   In a typical week, how many times do you talk on the phone with family, friends, or neighbors? More than 3   How often do you get together with friends or relatives? Twice   How often do you attend Mormon or Rastafarian services? Never   Do you belong to any clubs or organizations such as Mormon groups, unions, fraternal or athletic groups, or school groups? No   How often do you attend meetings of the clubs or organizations you belong to? Never   Are you , ,  , , never , or living with a partner?    Intimate Partner Violence   Within the last year, have you been afraid of your partner or ex-partner? No   Within the last year, have you been humiliated or emotionally abused in other ways by your partner or ex-partner? No   Within the last year, have you been kicked, hit, slapped, or otherwise physically hurt by your partner or ex-partner? No   Within the last year, have you been raped or forced to have any kind of sexual activity by your partner or ex-partner? No   Alcohol Use   Q1: How often do you have a drink containing alcohol? 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 None   Q3: How often do you have six or more drinks on one occasion? Never   Utilities   In the past 12 months has the electric, gas, oil, or water company threatened to shut off services in your home? No   Health Literacy   How often do you need to have someone help you when you read instructions, pamphlets, or other written material from your doctor or pharmacy? Never         Amelia Siegel RN

## 2024-09-10 ENCOUNTER — ANESTHESIA EVENT (OUTPATIENT)
Dept: OPERATING ROOM | Facility: HOSPITAL | Age: 79
End: 2024-09-10
Payer: MEDICARE

## 2024-09-10 ENCOUNTER — APPOINTMENT (OUTPATIENT)
Dept: RADIOLOGY | Facility: HOSPITAL | Age: 79
End: 2024-09-10
Payer: MEDICARE

## 2024-09-10 ENCOUNTER — APPOINTMENT (OUTPATIENT)
Dept: CARDIOLOGY | Facility: HOSPITAL | Age: 79
End: 2024-09-10
Payer: MEDICARE

## 2024-09-10 ENCOUNTER — ANESTHESIA (OUTPATIENT)
Dept: OPERATING ROOM | Facility: HOSPITAL | Age: 79
End: 2024-09-10
Payer: MEDICARE

## 2024-09-10 PROBLEM — I25.10 CAD (CORONARY ARTERY DISEASE): Status: ACTIVE | Noted: 2024-09-10

## 2024-09-10 LAB
ALBUMIN SERPL-MCNC: 3 G/DL (ref 3.5–5)
ALP BLD-CCNC: 100 U/L (ref 35–125)
ALT SERPL-CCNC: 18 U/L (ref 5–40)
ANION GAP SERPL CALC-SCNC: 12 MMOL/L
AST SERPL-CCNC: 17 U/L (ref 5–40)
BACTERIA UR CULT: NORMAL
BILIRUB SERPL-MCNC: 0.9 MG/DL (ref 0.1–1.2)
BUN SERPL-MCNC: 26 MG/DL (ref 8–25)
CALCIUM SERPL-MCNC: 9.3 MG/DL (ref 8.5–10.4)
CHLORIDE SERPL-SCNC: 100 MMOL/L (ref 97–107)
CO2 SERPL-SCNC: 20 MMOL/L (ref 24–31)
CREAT SERPL-MCNC: 2.1 MG/DL (ref 0.4–1.6)
EGFRCR SERPLBLD CKD-EPI 2021: 32 ML/MIN/1.73M*2
ERYTHROCYTE [DISTWIDTH] IN BLOOD BY AUTOMATED COUNT: 12.7 % (ref 11.5–14.5)
GLUCOSE SERPL-MCNC: 121 MG/DL (ref 65–99)
HCT VFR BLD AUTO: 32.2 % (ref 41–52)
HGB BLD-MCNC: 11 G/DL (ref 13.5–17.5)
MCH RBC QN AUTO: 31.2 PG (ref 26–34)
MCHC RBC AUTO-ENTMCNC: 34.2 G/DL (ref 32–36)
MCV RBC AUTO: 91 FL (ref 80–100)
NRBC BLD-RTO: 0 /100 WBCS (ref 0–0)
PLATELET # BLD AUTO: 237 X10*3/UL (ref 150–450)
POTASSIUM SERPL-SCNC: 4.2 MMOL/L (ref 3.4–5.1)
PROT SERPL-MCNC: 6 G/DL (ref 5.9–7.9)
RBC # BLD AUTO: 3.53 X10*6/UL (ref 4.5–5.9)
SODIUM SERPL-SCNC: 132 MMOL/L (ref 133–145)
WBC # BLD AUTO: 18 X10*3/UL (ref 4.4–11.3)

## 2024-09-10 PROCEDURE — 93005 ELECTROCARDIOGRAM TRACING: CPT

## 2024-09-10 PROCEDURE — 84075 ASSAY ALKALINE PHOSPHATASE: CPT | Performed by: INTERNAL MEDICINE

## 2024-09-10 PROCEDURE — 0T768DZ DILATION OF RIGHT URETER WITH INTRALUMINAL DEVICE, VIA NATURAL OR ARTIFICIAL OPENING ENDOSCOPIC: ICD-10-PCS | Performed by: UROLOGY

## 2024-09-10 PROCEDURE — 2500000004 HC RX 250 GENERAL PHARMACY W/ HCPCS (ALT 636 FOR OP/ED)

## 2024-09-10 PROCEDURE — BT1DZZZ FLUOROSCOPY OF RIGHT KIDNEY, URETER AND BLADDER: ICD-10-PCS | Performed by: UROLOGY

## 2024-09-10 PROCEDURE — 2500000002 HC RX 250 W HCPCS SELF ADMINISTERED DRUGS (ALT 637 FOR MEDICARE OP, ALT 636 FOR OP/ED): Performed by: INTERNAL MEDICINE

## 2024-09-10 PROCEDURE — 2500000001 HC RX 250 WO HCPCS SELF ADMINISTERED DRUGS (ALT 637 FOR MEDICARE OP): Performed by: INTERNAL MEDICINE

## 2024-09-10 PROCEDURE — 3700000001 HC GENERAL ANESTHESIA TIME - INITIAL BASE CHARGE: Performed by: UROLOGY

## 2024-09-10 PROCEDURE — 85027 COMPLETE CBC AUTOMATED: CPT | Performed by: INTERNAL MEDICINE

## 2024-09-10 PROCEDURE — 2500000001 HC RX 250 WO HCPCS SELF ADMINISTERED DRUGS (ALT 637 FOR MEDICARE OP): Performed by: UROLOGY

## 2024-09-10 PROCEDURE — 3600000003 HC OR TIME - INITIAL BASE CHARGE - PROCEDURE LEVEL THREE: Performed by: UROLOGY

## 2024-09-10 PROCEDURE — 3700000002 HC GENERAL ANESTHESIA TIME - EACH INCREMENTAL 1 MINUTE: Performed by: UROLOGY

## 2024-09-10 PROCEDURE — 1210000001 HC SEMI-PRIVATE ROOM DAILY

## 2024-09-10 PROCEDURE — 2500000004 HC RX 250 GENERAL PHARMACY W/ HCPCS (ALT 636 FOR OP/ED): Performed by: INTERNAL MEDICINE

## 2024-09-10 PROCEDURE — 36415 COLL VENOUS BLD VENIPUNCTURE: CPT | Performed by: INTERNAL MEDICINE

## 2024-09-10 PROCEDURE — 2720000007 HC OR 272 NO HCPCS: Performed by: UROLOGY

## 2024-09-10 PROCEDURE — 2780000003 HC OR 278 NO HCPCS: Performed by: UROLOGY

## 2024-09-10 PROCEDURE — 3600000008 HC OR TIME - EACH INCREMENTAL 1 MINUTE - PROCEDURE LEVEL THREE: Performed by: UROLOGY

## 2024-09-10 PROCEDURE — 2550000001 HC RX 255 CONTRASTS: Performed by: UROLOGY

## 2024-09-10 PROCEDURE — C1769 GUIDE WIRE: HCPCS | Performed by: UROLOGY

## 2024-09-10 PROCEDURE — 7100000001 HC RECOVERY ROOM TIME - INITIAL BASE CHARGE: Performed by: UROLOGY

## 2024-09-10 PROCEDURE — C2617 STENT, NON-COR, TEM W/O DEL: HCPCS | Performed by: UROLOGY

## 2024-09-10 PROCEDURE — 2500000005 HC RX 250 GENERAL PHARMACY W/O HCPCS

## 2024-09-10 PROCEDURE — 99233 SBSQ HOSP IP/OBS HIGH 50: CPT | Performed by: INTERNAL MEDICINE

## 2024-09-10 PROCEDURE — 76000 FLUOROSCOPY <1 HR PHYS/QHP: CPT

## 2024-09-10 PROCEDURE — 7100000002 HC RECOVERY ROOM TIME - EACH INCREMENTAL 1 MINUTE: Performed by: UROLOGY

## 2024-09-10 DEVICE — INLAY OPTIMA URETERAL STENT W/O GUIDEWIRE
Type: IMPLANTABLE DEVICE | Site: URETER | Status: FUNCTIONAL
Brand: BARD® INLAY OPTIMA® URETERAL STENT

## 2024-09-10 RX ORDER — IPRATROPIUM BROMIDE 0.5 MG/2.5ML
500 SOLUTION RESPIRATORY (INHALATION) EVERY 30 MIN PRN
Status: DISCONTINUED | OUTPATIENT
Start: 2024-09-10 | End: 2024-09-10 | Stop reason: HOSPADM

## 2024-09-10 RX ORDER — MEPERIDINE HYDROCHLORIDE 25 MG/ML
12.5 INJECTION INTRAMUSCULAR; INTRAVENOUS; SUBCUTANEOUS EVERY 10 MIN PRN
Status: DISCONTINUED | OUTPATIENT
Start: 2024-09-10 | End: 2024-09-10 | Stop reason: HOSPADM

## 2024-09-10 RX ORDER — SODIUM CHLORIDE, SODIUM LACTATE, POTASSIUM CHLORIDE, CALCIUM CHLORIDE 600; 310; 30; 20 MG/100ML; MG/100ML; MG/100ML; MG/100ML
INJECTION, SOLUTION INTRAVENOUS CONTINUOUS PRN
Status: DISCONTINUED | OUTPATIENT
Start: 2024-09-10 | End: 2024-09-10

## 2024-09-10 RX ORDER — ONDANSETRON HYDROCHLORIDE 2 MG/ML
4 INJECTION, SOLUTION INTRAVENOUS ONCE AS NEEDED
Status: DISCONTINUED | OUTPATIENT
Start: 2024-09-10 | End: 2024-09-10 | Stop reason: HOSPADM

## 2024-09-10 RX ORDER — SODIUM CHLORIDE 9 MG/ML
100 INJECTION, SOLUTION INTRAVENOUS CONTINUOUS
Status: DISCONTINUED | OUTPATIENT
Start: 2024-09-10 | End: 2024-09-12 | Stop reason: HOSPADM

## 2024-09-10 RX ORDER — LIDOCAINE HYDROCHLORIDE 10 MG/ML
INJECTION INFILTRATION; PERINEURAL AS NEEDED
Status: DISCONTINUED | OUTPATIENT
Start: 2024-09-10 | End: 2024-09-10

## 2024-09-10 RX ORDER — SODIUM CHLORIDE, SODIUM LACTATE, POTASSIUM CHLORIDE, CALCIUM CHLORIDE 600; 310; 30; 20 MG/100ML; MG/100ML; MG/100ML; MG/100ML
40 INJECTION, SOLUTION INTRAVENOUS CONTINUOUS
Status: DISCONTINUED | OUTPATIENT
Start: 2024-09-10 | End: 2024-09-10 | Stop reason: HOSPADM

## 2024-09-10 RX ORDER — FENTANYL CITRATE 50 UG/ML
50 INJECTION, SOLUTION INTRAMUSCULAR; INTRAVENOUS EVERY 5 MIN PRN
Status: DISCONTINUED | OUTPATIENT
Start: 2024-09-10 | End: 2024-09-10 | Stop reason: HOSPADM

## 2024-09-10 RX ORDER — ALBUTEROL SULFATE 0.83 MG/ML
2.5 SOLUTION RESPIRATORY (INHALATION) EVERY 30 MIN PRN
Status: DISCONTINUED | OUTPATIENT
Start: 2024-09-10 | End: 2024-09-10 | Stop reason: HOSPADM

## 2024-09-10 RX ORDER — FENTANYL CITRATE 50 UG/ML
INJECTION, SOLUTION INTRAMUSCULAR; INTRAVENOUS AS NEEDED
Status: DISCONTINUED | OUTPATIENT
Start: 2024-09-10 | End: 2024-09-10

## 2024-09-10 RX ORDER — PROPOFOL 10 MG/ML
INJECTION, EMULSION INTRAVENOUS AS NEEDED
Status: DISCONTINUED | OUTPATIENT
Start: 2024-09-10 | End: 2024-09-10

## 2024-09-10 RX ORDER — HYDROMORPHONE HYDROCHLORIDE 0.2 MG/ML
0.2 INJECTION INTRAMUSCULAR; INTRAVENOUS; SUBCUTANEOUS EVERY 5 MIN PRN
Status: DISCONTINUED | OUTPATIENT
Start: 2024-09-10 | End: 2024-09-10 | Stop reason: HOSPADM

## 2024-09-10 RX ORDER — PHENYLEPHRINE HCL IN 0.9% NACL 0.4MG/10ML
SYRINGE (ML) INTRAVENOUS AS NEEDED
Status: DISCONTINUED | OUTPATIENT
Start: 2024-09-10 | End: 2024-09-10

## 2024-09-10 RX ORDER — LABETALOL HYDROCHLORIDE 5 MG/ML
5 INJECTION, SOLUTION INTRAVENOUS EVERY 5 MIN PRN
Status: DISCONTINUED | OUTPATIENT
Start: 2024-09-10 | End: 2024-09-10 | Stop reason: HOSPADM

## 2024-09-10 SDOH — HEALTH STABILITY: MENTAL HEALTH: CURRENT SMOKER: 0

## 2024-09-10 ASSESSMENT — COGNITIVE AND FUNCTIONAL STATUS - GENERAL
DAILY ACTIVITIY SCORE: 24
DAILY ACTIVITIY SCORE: 24
MOBILITY SCORE: 24
MOBILITY SCORE: 24

## 2024-09-10 ASSESSMENT — PAIN SCALES - GENERAL
PAINLEVEL_OUTOF10: 0 - NO PAIN

## 2024-09-10 ASSESSMENT — PAIN - FUNCTIONAL ASSESSMENT
PAIN_FUNCTIONAL_ASSESSMENT: 0-10

## 2024-09-10 NOTE — H&P
Hematuria history Of Present Illness  Jaswinder Keenan is a 78 y.o. male presenting with hematuria.  [facility]  He decided to come to ER because he had episode of hematuria this morning.  He had little bit of abdominal pain as well.  He is having back pain for last 2 days.  In the emergency room CAT scan showed 14 mm stone right UPJ with moderate hydronephrosis with elevated white cell count and UA showing UTI     Past Medical History  No past medical history on file.  Coronary artery disease, hypertension, hyperlipidemia, bilateral inguinal hernia repair, bilateral inguinal hernia repair, left elbow  Surgical History  No past surgical history on file.  Right elbow fracture repair, right leg fracture repair, bilateral inguinal hernia repair   social History  He reports that he has been smoking cigarettes. He has a 50 pack-year smoking history. He has never used smokeless tobacco. No history on file for alcohol use and drug use.    Family History  No family history on file.  Both parents  of cancer   allergies  Penicillins    Review of Systems   Constitutional:  Negative for activity change, appetite change, chills, diaphoresis, fatigue, fever and unexpected weight change.   HENT:  Negative for congestion, dental problem, drooling, ear discharge, ear pain, facial swelling, hearing loss, mouth sores, nosebleeds, postnasal drip, rhinorrhea, sinus pressure, sinus pain, sneezing, sore throat, tinnitus, trouble swallowing and voice change.    Eyes:  Negative for photophobia, pain, discharge, redness, itching and visual disturbance.   Respiratory:  Negative for apnea, cough, choking, chest tightness, shortness of breath, wheezing and stridor.    Cardiovascular:  Negative for chest pain, palpitations and leg swelling.   Gastrointestinal:  Positive for abdominal pain. Negative for abdominal distention, anal bleeding, blood in stool, constipation, diarrhea, nausea, rectal pain and vomiting.   Endocrine:  Negative for cold intolerance, heat intolerance, polydipsia, polyphagia and polyuria.   Genitourinary:  Positive for hematuria. Negative for decreased urine volume, difficulty urinating, dysuria, enuresis, flank pain, frequency, genital sores and urgency.   Musculoskeletal:  Negative for arthralgias, back pain, gait problem, joint swelling, myalgias, neck pain and neck stiffness.   Skin:  Negative for color change, pallor, rash and wound.   Allergic/Immunologic: Negative for environmental allergies, food allergies and immunocompromised state.   Neurological:  Negative for dizziness, tremors, seizures, syncope, facial asymmetry, speech difficulty, weakness, light-headedness, numbness and headaches.   Hematological:  Negative for adenopathy. Does not bruise/bleed easily.   Psychiatric/Behavioral:  Negative for agitation, behavioral problems, confusion, decreased concentration, dysphoric mood, hallucinations, self-injury, sleep disturbance and suicidal ideas. The patient is not nervous/anxious and is not hyperactive.         Physical Exam  Vitals reviewed.   Constitutional:       Appearance: Normal appearance.   HENT:      Head: Normocephalic and atraumatic.      Right Ear: Tympanic membrane, ear canal and external ear normal.      Left Ear: Tympanic membrane, ear canal and external ear normal.      Nose: Nose normal.      Mouth/Throat:      Pharynx: Oropharynx is clear.   Eyes:      Extraocular Movements: Extraocular movements intact.      Conjunctiva/sclera: Conjunctivae normal.      Pupils: Pupils are equal, round, and reactive to light.   Cardiovascular:      Rate and Rhythm: Normal rate and regular rhythm.      Pulses: Normal pulses.      Heart sounds: Normal heart sounds.   Pulmonary:      Effort: Pulmonary effort is normal.      Breath sounds: Normal breath sounds.   Abdominal:      General: Abdomen is flat. Bowel sounds are normal.      Palpations: Abdomen is soft.   Musculoskeletal:      Cervical back: Normal  "range of motion and neck supple.   Skin:     General: Skin is warm and dry.   Neurological:      General: No focal deficit present.      Mental Status: He is alert and oriented to person, place, and time.   Psychiatric:         Mood and Affect: Mood normal.          Last Recorded Vitals  Blood pressure 133/54, pulse 77, temperature 37.6 °C (99.7 °F), temperature source Oral, resp. rate 16, height 1.727 m (5' 8\"), weight 97.5 kg (215 lb), SpO2 93%.    Relevant Results        Scheduled medications  ascorbic acid, 500 mg, oral, Daily  aspirin, 81 mg, oral, Daily  atorvastatin, 40 mg, oral, Nightly  clopidogrel, 75 mg, oral, Daily  cyanocobalamin, 1,000 mcg, oral, Daily  docusate sodium, 100 mg, oral, BID  enoxaparin, 40 mg, subcutaneous, Daily  [START ON 9/10/2024] levothyroxine, 112 mcg, oral, Daily  lisinopril, 5 mg, oral, Daily  metoprolol succinate XL, 50 mg, oral, Daily  pantoprazole, 40 mg, oral, Daily   Or  pantoprazole, 40 mg, intravenous, Daily  polyethylene glycol, 17 g, oral, Daily  tamsulosin, 0.4 mg, oral, Daily      Continuous medications  potassium xtwvdkj-U4-5.45%NaCl, 100 mL/hr, Last Rate: 100 mL/hr (09/09/24 2250)      PRN medications  PRN medications: acetaminophen **OR** acetaminophen **OR** acetaminophen, benzocaine-menthol, dextromethorphan-guaifenesin, guaiFENesin, melatonin, nitroglycerin, ondansetron **OR** ondansetron  Results for orders placed or performed during the hospital encounter of 09/09/24 (from the past 24 hour(s))   CBC and Auto Differential   Result Value Ref Range    WBC 18.3 (H) 4.4 - 11.3 x10*3/uL    nRBC 0.0 0.0 - 0.0 /100 WBCs    RBC 3.90 (L) 4.50 - 5.90 x10*6/uL    Hemoglobin 12.3 (L) 13.5 - 17.5 g/dL    Hematocrit 36.0 (L) 41.0 - 52.0 %    MCV 92 80 - 100 fL    MCH 31.5 26.0 - 34.0 pg    MCHC 34.2 32.0 - 36.0 g/dL    RDW 12.5 11.5 - 14.5 %    Platelets 263 150 - 450 x10*3/uL    Neutrophils % 79.9 40.0 - 80.0 %    Immature Granulocytes %, Automated 0.5 0.0 - 0.9 %    " Lymphocytes % 8.8 13.0 - 44.0 %    Monocytes % 10.0 2.0 - 10.0 %    Eosinophils % 0.5 0.0 - 6.0 %    Basophils % 0.3 0.0 - 2.0 %    Neutrophils Absolute 14.62 (H) 1.60 - 5.50 x10*3/uL    Immature Granulocytes Absolute, Automated 0.10 0.00 - 0.50 x10*3/uL    Lymphocytes Absolute 1.61 0.80 - 3.00 x10*3/uL    Monocytes Absolute 1.83 (H) 0.05 - 0.80 x10*3/uL    Eosinophils Absolute 0.09 0.00 - 0.40 x10*3/uL    Basophils Absolute 0.06 0.00 - 0.10 x10*3/uL   Urinalysis with Reflex Culture and Microscopic   Result Value Ref Range    Color, Urine Yellow Light-Yellow, Yellow, Dark-Yellow    Appearance, Urine Turbid (N) Clear    Specific Gravity, Urine 1.016 1.005 - 1.035    pH, Urine 6.0 5.0, 5.5, 6.0, 6.5, 7.0, 7.5, 8.0    Protein, Urine 30 (1+) (A) NEGATIVE, 10 (TRACE), 20 (TRACE) mg/dL    Glucose, Urine Normal Normal mg/dL    Blood, Urine 1.0 (3+) (A) NEGATIVE    Ketones, Urine NEGATIVE NEGATIVE mg/dL    Bilirubin, Urine NEGATIVE NEGATIVE    Urobilinogen, Urine Normal Normal mg/dL    Nitrite, Urine 1+ (A) NEGATIVE    Leukocyte Esterase, Urine 500 Marion/µL (A) NEGATIVE   Extra Urine Gray Tube   Result Value Ref Range    Extra Tube Hold for add-ons.    Comprehensive metabolic panel   Result Value Ref Range    Glucose 127 (H) 65 - 99 mg/dL    Sodium 129 (L) 133 - 145 mmol/L    Potassium 4.3 3.4 - 5.1 mmol/L    Chloride 98 97 - 107 mmol/L    Bicarbonate 22 (L) 24 - 31 mmol/L    Urea Nitrogen 24 8 - 25 mg/dL    Creatinine 1.90 (H) 0.40 - 1.60 mg/dL    eGFR 36 (L) >60 mL/min/1.73m*2    Calcium 9.9 8.5 - 10.4 mg/dL    Albumin 3.5 3.5 - 5.0 g/dL    Alkaline Phosphatase 116 35 - 125 U/L    Total Protein 6.8 5.9 - 7.9 g/dL    AST 19 5 - 40 U/L    Bilirubin, Total 1.0 0.1 - 1.2 mg/dL    ALT 18 5 - 40 U/L    Anion Gap 9 <=19 mmol/L   Lipase   Result Value Ref Range    Lipase 17 16 - 63 U/L   Microscopic Only, Urine   Result Value Ref Range    WBC, Urine >50 (A) 1-5, NONE /HPF    WBC Clumps, Urine MODERATE Reference range not  established. /HPF    RBC, Urine >20 (A) NONE, 1-2, 3-5 /HPF    Bacteria, Urine 1+ (A) NONE SEEN /HPF    Mucus, Urine FEW Reference range not established. /LPF     CT abdomen pelvis wo IV contrast    Result Date: 9/9/2024  Interpreted By:  Alvin Good, STUDY: CT ABDOMEN PELVIS WO IV CONTRAST;  9/9/2024 10:53 am   INDICATION: Signs/Symptoms:flank pain.   COMPARISON: 07/01/2021   ACCESSION NUMBER(S): [id number]   ORDERING CLINICIAN: MINA TORRES   TECHNIQUE: CT of the abdomen and pelvis was performed.  Contiguous axial images were obtained at 3 mm slice thickness through the abdomen and pelvis. Coronal and sagittal reconstructions at 3 mm slice thickness were performed. The patient received no intravenous contrast. Please note evaluation of the solid organs and vessels is limited in the absence of intravenous contrast. Given this caveat, the following observations are made:   FINDINGS: LOWER CHEST: Mild bibasilar atelectasis. Coronary atherosclerosis.   ABDOMEN:   LIVER: Unremarkable   BILE DUCTS: Not dilated.   GALLBLADDER: Multiple calcified gallstones.   PANCREAS: Unremarkable   SPLEEN: Unremarkable   ADRENAL GLANDS: Unremarkable   KIDNEYS AND URETERS: A few simple fluid attenuation cortical cysts, otherwise unremarkable left kidney without hydronephrosis. No left sided urinary tract calculi are identified.   There is a 14 mm proximal right ureteral calculus just distal to the ureteropelvic junction with moderate upstream hydronephrosis and perinephric stranding. A few additional right intrarenal calculi measure up to 9 mm in the lower pole. There is an indeterminate exophytic lesion in the right upper pole measuring 15 mm/55 Hounsfield unit. An additional smaller indeterminate endophytic lesion is suspected in the interpolar cortex measuring 49 Hounsfield unit, margins somewhat ill-defined making size measurement difficult. A large exophytic simple fluid attenuation cyst in the lower pole is also noted.   PELVIS:    BLADDER: Nondistended.   REPRODUCTIVE ORGANS: Mild prostatomegaly.   The previous right inguinal hernia has enlarged, previously only containing fat and now containing a segment of nonobstructed distal small bowel. The hernia passes lateral to the inferior epigastric vessels.   BOWEL: Mild sigmoid diverticulosis. There is a 2 cm linear metallic foreign body in the proximal sigmoid colon. No findings of bowel obstruction are otherwise identified. Unremarkable appendix.    Unremarkable mesentery.   VESSELS: Severe atherosclerosis. There is a infrarenal fusiform aneurysm infrarenal abdominal aortic aneurysm which is enlarged from 37 mm to 41 mm. The aneurysm does not extend below the bifurcation.   PERITONEUM AND RETROPERITONEUM: No free fluid or free air. No abdominal or pelvic lymphadenopathy.   BONE AND ABDOMINAL WALL: Unchanged mild chronic anterior wedging at L3. Multilevel degenerative changes appear similar to prior exam.       1.  14 mm proximal right ureteral calculus causing moderate hydronephrosis. Additional intrarenal calculi measure up to 9 mm. 2. Indeterminate right renal lesions. Suggest nonemergent MR or CT of the kidneys with and without contrast for further evaluation. 3. Previous right inguinal hernia has significantly enlarged, previously only containing fat and now containing a a segment of nonobstructed distal small bowel. 4. Infrarenal abdominal aortic aneurysm is enlarged from 37 mm to 41 mm in diameter compared to 3 years ago. 5. 2 cm in length metallic foreign body in the proximal sigmoid colon.   MACRO: None.   Signed by: Alvin Good 9/9/2024 11:22 AM Dictation workstation:   [id number]      Scheduled medications  ascorbic acid, 500 mg, oral, Daily  aspirin, 81 mg, oral, Daily  atorvastatin, 40 mg, oral, Nightly  clopidogrel, 75 mg, oral, Daily  cyanocobalamin, 1,000 mcg, oral, Daily  docusate sodium, 100 mg, oral, BID  enoxaparin, 40 mg, subcutaneous, Daily  [START ON 9/10/2024]  levothyroxine, 112 mcg, oral, Daily  lisinopril, 5 mg, oral, Daily  metoprolol succinate XL, 50 mg, oral, Daily  pantoprazole, 40 mg, oral, Daily   Or  pantoprazole, 40 mg, intravenous, Daily  polyethylene glycol, 17 g, oral, Daily  tamsulosin, 0.4 mg, oral, Daily      Continuous medications  potassium ggwchox-E7-1.45%NaCl, 100 mL/hr, Last Rate: 100 mL/hr (09/09/24 2250)      PRN medications  PRN medications: acetaminophen **OR** acetaminophen **OR** acetaminophen, benzocaine-menthol, dextromethorphan-guaifenesin, guaiFENesin, melatonin, nitroglycerin, ondansetron **OR** ondansetron    Assessment/Plan   Assessment & Plan  Nephrolithiasis    Obstructive uropathy    UTI (urinary tract infection)    Hematuria    Aneurysm of infrarenal abdominal aorta (CMS-HCC)    Dyslipidemia    Hypertension    BPH (benign prostatic hyperplasia)    Start IV antibiotics  IV fluids  Pain medication  Flomax  Urology consult  Continue home medications       I spent Minutes in the professional and overall care of this patient.      Isabel Rodriguez MD

## 2024-09-10 NOTE — ANESTHESIA POSTPROCEDURE EVALUATION
Patient: Jaswinder Keenan    Procedure Summary       Date: 09/10/24 Room / Location: [facility] OR 07 / [facility]    Anesthesia Start: 1458 Anesthesia Stop: 1534    Procedure: Cystoscopy with Insertion Stent Ureter (Right: Ureter) Diagnosis:       Nephrolithiasis      (Nephrolithiasis [N20.0])    Surgeons: Kevin Peguero MD Responsible Provider: Bob Bell DO    Anesthesia Type: general ASA Status: 3            Anesthesia Type: general    Vitals Value Taken Time   /61 09/10/24 1546   Temp 36.9 °C (98.4 °F) 09/10/24 1533   Pulse 74 09/10/24 1546   Resp 16 09/10/24 1546   SpO2 92 % 09/10/24 1546       Anesthesia Post Evaluation    Patient location during evaluation: bedside  Patient participation: complete - patient participated  Level of consciousness: awake and alert  Pain management: adequate  Multimodal analgesia pain management approach  Airway patency: patent  Two or more strategies used to mitigate risk of obstructive sleep apnea  Cardiovascular status: acceptable  Respiratory status: acceptable  Hydration status: acceptable  Postoperative Nausea and Vomiting: none        There were no known notable events for this encounter.

## 2024-09-10 NOTE — ANESTHESIA PREPROCEDURE EVALUATION
Patient: Jaswinder Keenan    Procedure Information       Date/Time: 09/10/24 1400    Procedure: Cystoscopy with Insertion Stent Ureter (Right: Ureter)    Location: [facility] OR 07 / [facility]    Surgeons: Kevin Peguero MD          No past medical history on file.  No past surgical history on file.    Relevant Problems   Anesthesia (within normal limits)      Cardiac  Negative stress test 2023, EF 73%     CT 2024 showed infrarenal Aortic aneurysm increased in size from 37->41mm since last scan   (+) Aneurysm of infrarenal abdominal aorta (CMS-HCC)   (+) CAD (coronary artery disease)   (+) Hypertension      /Renal   (+) BPH (benign prostatic hyperplasia)   (+) Nephrolithiasis   (+) UTI (urinary tract infection)      ID   (+) UTI (urinary tract infection)       Clinical information reviewed:    Allergies  Meds               NPO Detail:  No data recorded     Physical Exam    Airway  Mallampati: II  TM distance: >3 FB  Neck ROM: full     Cardiovascular   Comments: deferred   Dental     Comments: loose tooth   Pulmonary   Comments: deferred   Abdominal     Comments: deferred         Anesthesia Plan    History of general anesthesia?: yes  History of complications of general anesthesia?: no    ASA 3     general     The patient is not a current smoker.  Patient was not previously instructed to abstain from smoking on day of procedure.  Patient did not smoke on day of procedure.  Education provided regarding risk of obstructive sleep apnea.  intravenous induction   Postoperative administration of opioids is intended.  Anesthetic plan and risks discussed with patient.  Use of blood products discussed with patient who consented to blood products.    Plan discussed with CRNA and CAA.

## 2024-09-10 NOTE — CARE PLAN
Problem: Pain - Adult  Goal: Verbalizes/displays adequate comfort level or baseline comfort level  Outcome: Progressing     Problem: Safety - Adult  Goal: Free from fall injury  Outcome: Progressing     Problem: Discharge Planning  Goal: Discharge to home or other facility with appropriate resources  Outcome: Progressing     Problem: Chronic Conditions and Co-morbidities  Goal: Patient's chronic conditions and co-morbidity symptoms are monitored and maintained or improved  Outcome: Progressing   The patient's goals for the shift include      The clinical goals for the shift include safety

## 2024-09-10 NOTE — OP NOTE
Cystoscopy with Insertion Stent Ureter (R) Operative Note     Date: 2024 - 9/10/2024  OR Location: [facility]    Name: Jaswinder Keenan, : 1945, Age: 78 y.o., MRN: [id number], Sex: male    Diagnosis  Pre-op Diagnosis      * Nephrolithiasis [N20.0] Post-op Diagnosis     * Nephrolithiasis [N20.0]     Procedures  Cystoscopy with Insertion Stent Ureter  [id number] - ID CYSTO W/INSERT URETERAL STENT      Surgeons      * Kevin Peguero - Primary    Resident/Fellow/Other Assistant:  Surgeons and Role:  * No surgeons found with a matching role *    Procedure Summary  Anesthesia: General  ASA: III  Anesthesia Staff: Anesthesiologist: Bob Bell DO  CRNA: KRISTOFER Duron-CRNA, DNP  Estimated Blood Loss: 0 mL  Intra-op Medications:   Administrations occurring from 1400 to 1445 on 09/10/24:   Medication Name Total Dose   ascorbic acid (Vitamin C) tablet 500 mg Cannot be calculated   aspirin EC tablet 81 mg Cannot be calculated   atorvastatin (Lipitor) tablet 40 mg Cannot be calculated   benzocaine-menthol (Cepastat Sore Throat) lozenge 1 lozenge Cannot be calculated   cefTRIAXone (Rocephin) in dextrose 5% IV 1 g Cannot be calculated   clopidogrel (Plavix) tablet 75 mg Cannot be calculated   cyanocobalamin (Vitamin B-12) tablet 1,000 mcg Cannot be calculated   dextromethorphan-guaifenesin (Robitussin DM)  mg/5 mL oral liquid 5 mL Cannot be calculated   docusate sodium (Colace) capsule 100 mg Cannot be calculated   enoxaparin (Lovenox) syringe 40 mg Cannot be calculated   guaiFENesin (Mucinex) 12 hr tablet 600 mg Cannot be calculated   levothyroxine (Synthroid, Levoxyl) tablet 112 mcg Cannot be calculated   lisinopril tablet 5 mg Cannot be calculated   melatonin tablet 3 mg Cannot be calculated   metoprolol succinate XL (Toprol-XL) 24 hr tablet 50 mg Cannot be calculated   nitroglycerin (Nitrostat) SL tablet 0.4 mg Cannot be calculated   polyethylene glycol (Glycolax, Miralax) packet 17 g Cannot be  calculated   tamsulosin (Flomax) 24 hr capsule 0.4 mg Cannot be calculated              Anesthesia Record               Intraprocedure I/O Totals       None           Specimen: No specimens collected     Staff:   Relief Circulator: Adilene  Circulator: Anitha Crawford Person: Sagrario         Drains and/or Catheters: * None in log *    Tourniquet Times:         Implants:  Implants       Type Name Action Serial No.      Stent STENT, INLAY OPTIMA, 6FR X 26CM - [id number] Implanted               Findings: Purulent material in the bladder and purulent urine from the ureter.    Indications: Jaswinder Keenan is an 78 y.o. male who is having surgery for Nephrolithiasis [N20.0].     The patient was seen in the preoperative area. The risks, benefits, complications, treatment options, non-operative alternatives, expected recovery and outcomes were discussed with the patient. The possibilities of reaction to medication, pulmonary aspiration, injury to surrounding structures, bleeding, recurrent infection, the need for additional procedures, failure to diagnose a condition, and creating a complication requiring transfusion or operation were discussed with the patient. The patient concurred with the proposed plan, giving informed consent.  The site of surgery was properly noted/marked if necessary per policy. The patient has been actively warmed in preoperative area. Preoperative antibiotics are not indicated. Venous thrombosis prophylaxis are not indicated.    Procedure Details: Patient was taken the operating room placed under general anesthesia.  He was then placed in the dorsolithotomy position and prepped and draped in sterile manner.  The patient underwent cystoscopy revealing moderately occlusive prostate.  Inspection of the bladder revealed a large amount of purulent material.  The right ureteral orifice was finally found.  An open-ended flexible tip catheter was placed up the ureter.  A retrograde was performed.  This outline  the large stone in the renal pelvis.  A guidewire was placed and the flexible tip catheter was removed.  A 6 Occitan 26 cm double-J stent was placed with a good coil in the kidney and a good coil in the bladder.  The bladder was drained and the patient was awakened and taken to the recovery room in stable condition.  Complications:  None; patient tolerated the procedure well.    Disposition: PACU - hemodynamically stable.  Condition: stable         Additional Details:       Attending Attestation: I performed the procedure.    Kevin Peguero  Phone Number: [phone]

## 2024-09-10 NOTE — PROGRESS NOTES
Occupational Therapy                 Therapy Communication Note    Patient Name: Jaswinder Keenan  MRN: [id number]  Today's Date: 9/10/2024     Discipline: Occupational Therapy    Missed Visit Reason: Patient in a medical procedure (Pt is currently off the floor at surgery.)    Missed Time: Cancel

## 2024-09-10 NOTE — ANESTHESIA PROCEDURE NOTES
Airway  Date/Time: 9/10/2024 3:05 PM  Urgency: elective    Airway not difficult    Staffing  Performed: CRNA   Authorized by: Bob Bell DO    Performed by: KRISTOFER Duron-CRNA, JAMEY  Patient location during procedure: OR    Indications and Patient Condition  Indications for airway management: anesthesia and airway protection  Spontaneous Ventilation: absent  Sedation level: deep  Preoxygenated: yes  Patient position: sniffing  Mask difficulty assessment: 1 - vent by mask  Planned trial extubation    Final Airway Details  Final airway type: supraglottic airway      Successful airway: classic  Size 5     Number of attempts at approach: 1

## 2024-09-10 NOTE — NURSING NOTE
Assumed care of patient, patient awake in bed, call light within reach. Patient is running a temp. Patient states he feels hot but not sick. Message sent to Dr. Rodriguez.

## 2024-09-10 NOTE — PROGRESS NOTES
Physical Therapy                 Therapy Communication Note    Patient Name: Jaswinder Keenan  MRN: [id number]  Today's Date: 9/10/2024     Discipline: Physical Therapy    Missed Visit Reason: Missed Visit Reason: Cancel, Other (Comment) (pt going down for surgery at this time)    Missed Time: Cancel

## 2024-09-11 VITALS
HEART RATE: 67 BPM | WEIGHT: 215 LBS | TEMPERATURE: 96.8 F | BODY MASS INDEX: 32.58 KG/M2 | SYSTOLIC BLOOD PRESSURE: 102 MMHG | HEIGHT: 68 IN | RESPIRATION RATE: 18 BRPM | DIASTOLIC BLOOD PRESSURE: 55 MMHG | OXYGEN SATURATION: 100 %

## 2024-09-11 LAB
ANION GAP SERPL CALC-SCNC: 9 MMOL/L
ANION GAP SERPL CALC-SCNC: 9 MMOL/L
BUN SERPL-MCNC: 27 MG/DL (ref 8–25)
BUN SERPL-MCNC: 27 MG/DL (ref 8–25)
CALCIUM SERPL-MCNC: 9.7 MG/DL (ref 8.5–10.4)
CALCIUM SERPL-MCNC: 9.7 MG/DL (ref 8.5–10.4)
CHLORIDE SERPL-SCNC: 104 MMOL/L (ref 97–107)
CHLORIDE SERPL-SCNC: 96 MMOL/L (ref 97–107)
CO2 SERPL-SCNC: 20 MMOL/L (ref 24–31)
CO2 SERPL-SCNC: 22 MMOL/L (ref 24–31)
CREAT SERPL-MCNC: 1.9 MG/DL (ref 0.4–1.6)
CREAT SERPL-MCNC: 2 MG/DL (ref 0.4–1.6)
EGFRCR SERPLBLD CKD-EPI 2021: 34 ML/MIN/1.73M*2
EGFRCR SERPLBLD CKD-EPI 2021: 36 ML/MIN/1.73M*2
ERYTHROCYTE [DISTWIDTH] IN BLOOD BY AUTOMATED COUNT: 12.8 % (ref 11.5–14.5)
GLUCOSE SERPL-MCNC: 119 MG/DL (ref 65–99)
GLUCOSE SERPL-MCNC: 133 MG/DL (ref 65–99)
HCT VFR BLD AUTO: 33.2 % (ref 41–52)
HGB BLD-MCNC: 11.1 G/DL (ref 13.5–17.5)
MCH RBC QN AUTO: 31.3 PG (ref 26–34)
MCHC RBC AUTO-ENTMCNC: 33.4 G/DL (ref 32–36)
MCV RBC AUTO: 94 FL (ref 80–100)
NRBC BLD-RTO: 0 /100 WBCS (ref 0–0)
PLATELET # BLD AUTO: 239 X10*3/UL (ref 150–450)
POTASSIUM SERPL-SCNC: 4.2 MMOL/L (ref 3.4–5.1)
POTASSIUM SERPL-SCNC: 4.3 MMOL/L (ref 3.4–5.1)
RBC # BLD AUTO: 3.55 X10*6/UL (ref 4.5–5.9)
SODIUM SERPL-SCNC: 127 MMOL/L (ref 133–145)
SODIUM SERPL-SCNC: 133 MMOL/L (ref 133–145)
WBC # BLD AUTO: 11.5 X10*3/UL (ref 4.4–11.3)

## 2024-09-11 PROCEDURE — 99239 HOSP IP/OBS DSCHRG MGMT >30: CPT | Performed by: INTERNAL MEDICINE

## 2024-09-11 PROCEDURE — 80048 BASIC METABOLIC PNL TOTAL CA: CPT | Performed by: INTERNAL MEDICINE

## 2024-09-11 PROCEDURE — 2500000004 HC RX 250 GENERAL PHARMACY W/ HCPCS (ALT 636 FOR OP/ED): Performed by: UROLOGY

## 2024-09-11 PROCEDURE — 36415 COLL VENOUS BLD VENIPUNCTURE: CPT | Performed by: INTERNAL MEDICINE

## 2024-09-11 PROCEDURE — 2500000001 HC RX 250 WO HCPCS SELF ADMINISTERED DRUGS (ALT 637 FOR MEDICARE OP): Performed by: UROLOGY

## 2024-09-11 PROCEDURE — 97161 PT EVAL LOW COMPLEX 20 MIN: CPT | Mod: GP

## 2024-09-11 PROCEDURE — 85027 COMPLETE CBC AUTOMATED: CPT | Performed by: INTERNAL MEDICINE

## 2024-09-11 PROCEDURE — 2500000002 HC RX 250 W HCPCS SELF ADMINISTERED DRUGS (ALT 637 FOR MEDICARE OP, ALT 636 FOR OP/ED): Performed by: UROLOGY

## 2024-09-11 RX ORDER — CEPHALEXIN 500 MG/1
500 CAPSULE ORAL 3 TIMES DAILY
Qty: 30 CAPSULE | Refills: 0 | Status: SHIPPED | OUTPATIENT
Start: 2024-09-11

## 2024-09-11 RX ORDER — TAMSULOSIN HYDROCHLORIDE 0.4 MG/1
0.4 CAPSULE ORAL DAILY
Qty: 30 CAPSULE | Refills: 0 | Status: SHIPPED | OUTPATIENT
Start: 2024-09-12

## 2024-09-11 ASSESSMENT — COGNITIVE AND FUNCTIONAL STATUS - GENERAL
MOBILITY SCORE: 23
DAILY ACTIVITIY SCORE: 24
MOBILITY SCORE: 24
MOVING TO AND FROM BED TO CHAIR: A LITTLE

## 2024-09-11 ASSESSMENT — PAIN SCALES - GENERAL
PAINLEVEL_OUTOF10: 0 - NO PAIN
PAINLEVEL_OUTOF10: 0 - NO PAIN

## 2024-09-11 ASSESSMENT — ACTIVITIES OF DAILY LIVING (ADL): ADL_ASSISTANCE: INDEPENDENT

## 2024-09-11 ASSESSMENT — PAIN - FUNCTIONAL ASSESSMENT
PAIN_FUNCTIONAL_ASSESSMENT: 0-10
PAIN_FUNCTIONAL_ASSESSMENT: 0-10

## 2024-09-11 NOTE — PROGRESS NOTES
"Pt is day one s/p cysto and stent and is feeling much better today, no complaints    Blood pressure 125/73, pulse 63, temperature 36.4 °C (97.5 °F), temperature source Oral, resp. rate 18, height 1.727 m (5' 8\"), weight 97.5 kg (215 lb), SpO2 98%.     Results for orders placed or performed during the hospital encounter of 09/09/24 (from the past 24 hour(s))   CBC   Result Value Ref Range    WBC 11.5 (H) 4.4 - 11.3 x10*3/uL    nRBC 0.0 0.0 - 0.0 /100 WBCs    RBC 3.55 (L) 4.50 - 5.90 x10*6/uL    Hemoglobin 11.1 (L) 13.5 - 17.5 g/dL    Hematocrit 33.2 (L) 41.0 - 52.0 %    MCV 94 80 - 100 fL    MCH 31.3 26.0 - 34.0 pg    MCHC 33.4 32.0 - 36.0 g/dL    RDW 12.8 11.5 - 14.5 %    Platelets 239 150 - 450 x10*3/uL   Basic Metabolic Panel   Result Value Ref Range    Glucose 133 (H) 65 - 99 mg/dL    Sodium 133 133 - 145 mmol/L    Potassium 4.3 3.4 - 5.1 mmol/L    Chloride 104 97 - 107 mmol/L    Bicarbonate 20 (L) 24 - 31 mmol/L    Urea Nitrogen 27 (H) 8 - 25 mg/dL    Creatinine 2.00 (H) 0.40 - 1.60 mg/dL    eGFR 34 (L) >60 mL/min/1.73m*2    Calcium 9.7 8.5 - 10.4 mg/dL    Anion Gap 9 <=19 mmol/L        PE:  appears well, no sob nor wheezes  Eating well    A/p:  okay to discharge per primary service at any time from  li and nanda Peguero 1-2 weeks with sumi  "

## 2024-09-11 NOTE — PROGRESS NOTES
Physical Therapy    Physical Therapy Evaluation    Patient Name: Jaswinder Keenan  MRN: [id number]  Department: [address]  Room: [address] [place] [address]  Today's Date: 9/11/2024   Time Calculation  Start Time: 0948  Stop Time: 1006  Time Calculation (min): 18 min    Assessment/Plan   PT Assessment  Rehab Prognosis: Excellent  Barriers to Discharge: none  Evaluation/Treatment Tolerance: Patient tolerated treatment well  Medical Staff Made Aware: Yes  Strengths: Attitude of self, Ability to acquire knowledge  Barriers to Participation: Comorbidities  End of Session Communication: Bedside nurse  Assessment Comment: 78 year old male s/p cystoscopy with insertion of STENT in ureter. Patient denies pain, demonstrates independent and safe mobility. Reports no functional decline. Has no acute PT needs. Discharge PT. Patient is safe to ambulate in hallways.  End of Session Patient Position: Up in chair, Alarm off, not on at start of session  IP OR SWING BED PT PLAN  Inpatient or Swing Bed: Inpatient  PT Plan  PT Plan: PT Eval only  PT Eval Only Reason: At baseline function  PT Discharge Recommendations: No further acute PT, No PT needed after discharge  Equipment Recommended upon Discharge:  (none)  PT Recommended Transfer Status: Independent  PT - OK to Discharge: Yes      Subjective   General Visit Information:  General  Reason for Referral: Impaired mobility, nephroliathiasis  Referred By: Dr. ROGER Rodriguez  Past Medical History Relevant to Rehab: CAD, HTN, lyperlipidemia, B inguinal hernia, smoker, dyslip, aneurysm of infrarenal abdominal aorta  Patient Position Received: Up in bathroom  Preferred Learning Style: auditory  General Comment: 78 yr old with hematuria and abdominal pain, found to have R stone and +UTI, 9/10 underwent cystoscopy with insertion of STENT in ureter  Home Living:  Home Living  Type of Home: House  Lives With: Spouse  Home Adaptive Equipment: None  Home Layout: Two level  Home Access: Other (Comment) (4 steps with  rail)  Prior Level of Function:  Prior Function Per Pt/Caregiver Report  Level of [place]: Independent with ADLs and functional transfers, Independent with homemaking with ambulation  Receives Help From:  (provides assistance to wife)  ADL Assistance: Independent  Homemaking Assistance: Independent  Ambulatory Assistance: Independent  Prior Function Comments: +drives, I IADL's  Precautions:  Precautions  Hearing/Visual Limitations: glasses  Medical Precautions: Fall precautions            Objective   Pain:  Pain Assessment  Pain Assessment: 0-10  0-10 (Numeric) Pain Score: 0 - No pain  Cognition:  Cognition  Overall Cognitive Status: Within Functional Limits  Orientation Level: Oriented X4    General Assessments:  General Observation  General Observation: no post op complaints this date.               Activity Tolerance  Endurance: Endurance does not limit participation in activity    Sensation  Light Touch: No apparent deficits    Strength  Strength Comments: WFL B LE for strength and ROM  Postural Control  Postural Control: Within Functional Limits    Static Sitting Balance  Static Sitting-Level of Assistance: Independent  Dynamic Sitting Balance  Dynamic Sitting-Level of Assistance: Independent    Static Standing Balance  Static Standing-Level of Assistance: Independent  Dynamic Standing Balance  Dynamic Standing-Level of Assistance: Independent  Functional Assessments:  Transfers  Transfer: Yes  Transfer 1  Technique 1: Sit to stand, Stand to sit  Transfer Level of Assistance 1: Independent  Trials/Comments 1: demonstrates safe transfers on/off toilet and on/off chair    Ambulation/Gait Training  Ambulation/Gait Training Performed: Yes  Ambulation/Gait Training 1  Surface 1: Level tile  Device 1: No device  Assistance 1: Independent  Quality of Gait 1: Narrow base of support  Comments/Distance (ft) 1: 50 ft with turns Independent, steady and safe, no loss of balance.  Extremity/Trunk Assessments:  RLE   RLE  : Within Functional Limits     Outcome Measures:  [facility] Basic Mobility  Turning from your back to your side while in a flat bed without using bedrails: None  Moving from lying on your back to sitting on the side of a flat bed without using bedrails: None  Moving to and from bed to chair (including a wheelchair): None  Standing up from a chair using your arms (e.g. wheelchair or bedside chair): None  To walk in hospital room: None  Climbing 3-5 steps with railing: None  Basic Mobility - Total Score: 24    Encounter Problems       Encounter Problems (Active)       Pain - Adult              Education Documentation  Mobility Training, taught by Vivien Luis, PT at 9/11/2024 12:47 PM.  Learner: Patient  Readiness: Eager  Method: Explanation  Response: Verbalizes Understanding

## 2024-09-11 NOTE — PROGRESS NOTES
Occupational Therapy    OT Treatment    Patient Name: Jaswinder Keenan  MRN: [id number]  Department: [facility] 4 [facility]  Room: [facility]/438-  Today's Date: 9/11/2024             General:  General  General Comment:  (Pt is up completing ADL/functional mobility independently without concern for safety. No acute OT needs at this time.)

## 2024-09-11 NOTE — PROGRESS NOTES
"Patient had cystoscopy with stent insertion Jaswinder Keenan is a 78 y.o. male on day 1 of admission presenting with Nephrolithiasis.    Subjective   Patient had cystoscopy and stent insertion.  His symptoms are doing much better.  He is craving for cigarettes but refusing nicotine patch         Objective     Physical Exam  Vitals reviewed.   Constitutional:       Appearance: Normal appearance.   HENT:      Head: Normocephalic and atraumatic.      Right Ear: Tympanic membrane, ear canal and external ear normal.      Left Ear: Tympanic membrane, ear canal and external ear normal.      Nose: Nose normal.      Mouth/Throat:      Pharynx: Oropharynx is clear.   Eyes:      Extraocular Movements: Extraocular movements intact.      Conjunctiva/sclera: Conjunctivae normal.      Pupils: Pupils are equal, round, and reactive to light.   Cardiovascular:      Rate and Rhythm: Normal rate and regular rhythm.      Pulses: Normal pulses.      Heart sounds: Normal heart sounds.   Pulmonary:      Effort: Pulmonary effort is normal.      Breath sounds: Normal breath sounds.   Abdominal:      General: Abdomen is flat. Bowel sounds are normal.      Palpations: Abdomen is soft.   Musculoskeletal:      Cervical back: Normal range of motion and neck supple.   Skin:     General: Skin is warm and dry.   Neurological:      General: No focal deficit present.      Mental Status: He is alert and oriented to person, place, and time.   Psychiatric:         Mood and Affect: Mood normal.         Last Recorded Vitals  Blood pressure 119/60, pulse 72, temperature 36.8 °C (98.2 °F), temperature source Oral, resp. rate 17, height 1.727 m (5' 8\"), weight 97.5 kg (215 lb), SpO2 94%.  Intake/Output last 3 Shifts:  I/O last 3 completed shifts:  In: 1656.7 (17 mL/kg) [P.O.:325; I.V.:500 (5.1 mL/kg); IV Piggyback:831.7]  Out: 1000 (10.3 mL/kg) [Urine:1000 (0.3 mL/kg/hr)]  Weight: 97.5 kg     Relevant Results                 Scheduled medications  ascorbic " acid, 500 mg, oral, Daily  aspirin, 81 mg, oral, Daily  atorvastatin, 40 mg, oral, Nightly  cefTRIAXone, 1 g, intravenous, q24h  clopidogrel, 75 mg, oral, Daily  cyanocobalamin, 1,000 mcg, oral, Daily  docusate sodium, 100 mg, oral, BID  enoxaparin, 40 mg, subcutaneous, Daily  levothyroxine, 112 mcg, oral, Daily  lisinopril, 5 mg, oral, Daily  metoprolol succinate XL, 50 mg, oral, Daily  pantoprazole, 40 mg, oral, Daily   Or  pantoprazole, 40 mg, intravenous, Daily  polyethylene glycol, 17 g, oral, Daily  tamsulosin, 0.4 mg, oral, Daily      Continuous medications  sodium chloride 0.9%, 100 mL/hr      PRN medications  PRN medications: acetaminophen **OR** acetaminophen **OR** acetaminophen, benzocaine-menthol, dextromethorphan-guaifenesin, guaiFENesin, melatonin, nitroglycerin, ondansetron **OR** ondansetron  Results for orders placed or performed during the hospital encounter of 09/09/24 (from the past 24 hour(s))   CBC   Result Value Ref Range    WBC 18.0 (H) 4.4 - 11.3 x10*3/uL    nRBC 0.0 0.0 - 0.0 /100 WBCs    RBC 3.53 (L) 4.50 - 5.90 x10*6/uL    Hemoglobin 11.0 (L) 13.5 - 17.5 g/dL    Hematocrit 32.2 (L) 41.0 - 52.0 %    MCV 91 80 - 100 fL    MCH 31.2 26.0 - 34.0 pg    MCHC 34.2 32.0 - 36.0 g/dL    RDW 12.7 11.5 - 14.5 %    Platelets 237 150 - 450 x10*3/uL   Comprehensive metabolic panel   Result Value Ref Range    Glucose 121 (H) 65 - 99 mg/dL    Sodium 132 (L) 133 - 145 mmol/L    Potassium 4.2 3.4 - 5.1 mmol/L    Chloride 100 97 - 107 mmol/L    Bicarbonate 20 (L) 24 - 31 mmol/L    Urea Nitrogen 26 (H) 8 - 25 mg/dL    Creatinine 2.10 (H) 0.40 - 1.60 mg/dL    eGFR 32 (L) >60 mL/min/1.73m*2    Calcium 9.3 8.5 - 10.4 mg/dL    Albumin 3.0 (L) 3.5 - 5.0 g/dL    Alkaline Phosphatase 100 35 - 125 U/L    Total Protein 6.0 5.9 - 7.9 g/dL    AST 17 5 - 40 U/L    Bilirubin, Total 0.9 0.1 - 1.2 mg/dL    ALT 18 5 - 40 U/L    Anion Gap 12 <=19 mmol/L     FL less than 1 hour    Result Date: 9/10/2024  These images are not  reportable by radiology and will not be interpreted by  Radiologists.    CT abdomen pelvis wo IV contrast    Result Date: 9/9/2024  Interpreted By:  Alvin Good, STUDY: CT ABDOMEN PELVIS WO IV CONTRAST;  9/9/2024 10:53 am   INDICATION: Signs/Symptoms:flank pain.   COMPARISON: 07/01/2021   ACCESSION NUMBER(S): [id number]   ORDERING CLINICIAN: MINA TORRES   TECHNIQUE: CT of the abdomen and pelvis was performed.  Contiguous axial images were obtained at 3 mm slice thickness through the abdomen and pelvis. Coronal and sagittal reconstructions at 3 mm slice thickness were performed. The patient received no intravenous contrast. Please note evaluation of the solid organs and vessels is limited in the absence of intravenous contrast. Given this caveat, the following observations are made:   FINDINGS: LOWER CHEST: Mild bibasilar atelectasis. Coronary atherosclerosis.   ABDOMEN:   LIVER: Unremarkable   BILE DUCTS: Not dilated.   GALLBLADDER: Multiple calcified gallstones.   PANCREAS: Unremarkable   SPLEEN: Unremarkable   ADRENAL GLANDS: Unremarkable   KIDNEYS AND URETERS: A few simple fluid attenuation cortical cysts, otherwise unremarkable left kidney without hydronephrosis. No left sided urinary tract calculi are identified.   There is a 14 mm proximal right ureteral calculus just distal to the ureteropelvic junction with moderate upstream hydronephrosis and perinephric stranding. A few additional right intrarenal calculi measure up to 9 mm in the lower pole. There is an indeterminate exophytic lesion in the right upper pole measuring 15 mm/55 Hounsfield unit. An additional smaller indeterminate endophytic lesion is suspected in the interpolar cortex measuring 49 Hounsfield unit, margins somewhat ill-defined making size measurement difficult. A large exophytic simple fluid attenuation cyst in the lower pole is also noted.   PELVIS:   BLADDER: Nondistended.   REPRODUCTIVE ORGANS: Mild prostatomegaly.   The previous right  inguinal hernia has enlarged, previously only containing fat and now containing a segment of nonobstructed distal small bowel. The hernia passes lateral to the inferior epigastric vessels.   BOWEL: Mild sigmoid diverticulosis. There is a 2 cm linear metallic foreign body in the proximal sigmoid colon. No findings of bowel obstruction are otherwise identified. Unremarkable appendix.    Unremarkable mesentery.   VESSELS: Severe atherosclerosis. There is a infrarenal fusiform aneurysm infrarenal abdominal aortic aneurysm which is enlarged from 37 mm to 41 mm. The aneurysm does not extend below the bifurcation.   PERITONEUM AND RETROPERITONEUM: No free fluid or free air. No abdominal or pelvic lymphadenopathy.   BONE AND ABDOMINAL WALL: Unchanged mild chronic anterior wedging at L3. Multilevel degenerative changes appear similar to prior exam.       1.  14 mm proximal right ureteral calculus causing moderate hydronephrosis. Additional intrarenal calculi measure up to 9 mm. 2. Indeterminate right renal lesions. Suggest nonemergent MR or CT of the kidneys with and without contrast for further evaluation. 3. Previous right inguinal hernia has significantly enlarged, previously only containing fat and now containing a a segment of nonobstructed distal small bowel. 4. Infrarenal abdominal aortic aneurysm is enlarged from 37 mm to 41 mm in diameter compared to 3 years ago. 5. 2 cm in length metallic foreign body in the proximal sigmoid colon.   MACRO: None.   Signed by: Alvin Good 9/9/2024 11:22 AM Dictation workstation:   [id number]                Assessment/Plan   Assessment & Plan  Nephrolithiasis    Obstructive uropathy    UTI (urinary tract infection)    Hematuria    Aneurysm of infrarenal abdominal aorta (CMS-HCC)    Dyslipidemia    Hypertension    BPH (benign prostatic hyperplasia)    CAD (coronary artery disease)    S/p stent placement  Rocephin for UTI  Acute kidney injury continue IV fluids  Monitor kidney  function  Hopefully white cell count should improve  Final cultures pending  Sodium monitor  Nicotine patch offered but patient is refusing  Blood pressure stable       I spent  minutes in the professional and overall care of this patient.      Isabel Rodriguez MD

## 2024-09-11 NOTE — PROGRESS NOTES
09/11/24 8611   Discharge Planning   Expected Discharge Disposition Home     Met with patient at bedside.  Patient is independent from home with his spouse.  PT signed off. Patient declines home going needs.

## 2024-09-12 NOTE — PROGRESS NOTES
"Patient had cystoscopy with stent insertion Jaswinder Keenan is a 78 y.o. male on day 2 of admission presenting with Nephrolithiasis.    Subjective   Patient is eager to go home.  He wants to smoke         Objective     Physical Exam  Vitals reviewed.   Constitutional:       Appearance: Normal appearance.   HENT:      Head: Normocephalic and atraumatic.      Right Ear: Tympanic membrane, ear canal and external ear normal.      Left Ear: Tympanic membrane, ear canal and external ear normal.      Nose: Nose normal.      Mouth/Throat:      Pharynx: Oropharynx is clear.   Eyes:      Extraocular Movements: Extraocular movements intact.      Conjunctiva/sclera: Conjunctivae normal.      Pupils: Pupils are equal, round, and reactive to light.   Cardiovascular:      Rate and Rhythm: Normal rate and regular rhythm.      Pulses: Normal pulses.      Heart sounds: Normal heart sounds.   Pulmonary:      Effort: Pulmonary effort is normal.      Breath sounds: Normal breath sounds.   Abdominal:      General: Abdomen is flat. Bowel sounds are normal.      Palpations: Abdomen is soft.   Musculoskeletal:      Cervical back: Normal range of motion and neck supple.   Skin:     General: Skin is warm and dry.   Neurological:      General: No focal deficit present.      Mental Status: He is alert and oriented to person, place, and time.   Psychiatric:         Mood and Affect: Mood normal.         Last Recorded Vitals  Blood pressure 102/55, pulse 67, temperature 36 °C (96.8 °F), temperature source Oral, resp. rate 18, height 1.727 m (5' 8\"), weight 97.5 kg (215 lb), SpO2 100%.  Intake/Output last 3 Shifts:  I/O last 3 completed shifts:  In: 825 (8.5 mL/kg) [P.O.:325; I.V.:500 (5.1 mL/kg)]  Out: 600 (6.2 mL/kg) [Urine:600 (0.2 mL/kg/hr)]  Weight: 97.5 kg     Relevant Results                 Scheduled medications  ascorbic acid, 500 mg, oral, Daily  aspirin, 81 mg, oral, Daily  atorvastatin, 40 mg, oral, Nightly  cefTRIAXone, 1 g, " intravenous, q24h  clopidogrel, 75 mg, oral, Daily  cyanocobalamin, 1,000 mcg, oral, Daily  docusate sodium, 100 mg, oral, BID  enoxaparin, 40 mg, subcutaneous, Daily  levothyroxine, 112 mcg, oral, Daily  lisinopril, 5 mg, oral, Daily  metoprolol succinate XL, 50 mg, oral, Daily  pantoprazole, 40 mg, oral, Daily   Or  pantoprazole, 40 mg, intravenous, Daily  polyethylene glycol, 17 g, oral, Daily  tamsulosin, 0.4 mg, oral, Daily      Continuous medications  sodium chloride 0.9%, 100 mL/hr      PRN medications  PRN medications: acetaminophen **OR** acetaminophen **OR** acetaminophen, benzocaine-menthol, dextromethorphan-guaifenesin, guaiFENesin, melatonin, nitroglycerin, ondansetron **OR** ondansetron  Results for orders placed or performed during the hospital encounter of 09/09/24 (from the past 24 hour(s))   CBC   Result Value Ref Range    WBC 11.5 (H) 4.4 - 11.3 x10*3/uL    nRBC 0.0 0.0 - 0.0 /100 WBCs    RBC 3.55 (L) 4.50 - 5.90 x10*6/uL    Hemoglobin 11.1 (L) 13.5 - 17.5 g/dL    Hematocrit 33.2 (L) 41.0 - 52.0 %    MCV 94 80 - 100 fL    MCH 31.3 26.0 - 34.0 pg    MCHC 33.4 32.0 - 36.0 g/dL    RDW 12.8 11.5 - 14.5 %    Platelets 239 150 - 450 x10*3/uL   Basic Metabolic Panel   Result Value Ref Range    Glucose 133 (H) 65 - 99 mg/dL    Sodium 133 133 - 145 mmol/L    Potassium 4.3 3.4 - 5.1 mmol/L    Chloride 104 97 - 107 mmol/L    Bicarbonate 20 (L) 24 - 31 mmol/L    Urea Nitrogen 27 (H) 8 - 25 mg/dL    Creatinine 2.00 (H) 0.40 - 1.60 mg/dL    eGFR 34 (L) >60 mL/min/1.73m*2    Calcium 9.7 8.5 - 10.4 mg/dL    Anion Gap 9 <=19 mmol/L   Basic metabolic panel   Result Value Ref Range    Glucose 119 (H) 65 - 99 mg/dL    Sodium 127 (L) 133 - 145 mmol/L    Potassium 4.2 3.4 - 5.1 mmol/L    Chloride 96 (L) 97 - 107 mmol/L    Bicarbonate 22 (L) 24 - 31 mmol/L    Urea Nitrogen 27 (H) 8 - 25 mg/dL    Creatinine 1.90 (H) 0.40 - 1.60 mg/dL    eGFR 36 (L) >60 mL/min/1.73m*2    Calcium 9.7 8.5 - 10.4 mg/dL    Anion Gap 9 <=19  mmol/L     FL less than 1 hour    Result Date: 9/10/2024  These images are not reportable by radiology and will not be interpreted by  Radiologists.                Assessment/Plan   Assessment & Plan  Nephrolithiasis    Obstructive uropathy    UTI (urinary tract infection)    Hematuria    Aneurysm of infrarenal abdominal aorta (CMS-HCC)    Dyslipidemia    Hypertension    BPH (benign prostatic hyperplasia)    CAD (coronary artery disease)    S/p stent placement  DC on Keflex   acute kidney injury continue IV fluids  Explained to patient that his kidney function still elevated  He still wants to go home  He agreed to repeat BMP in making sure that it is trending downwards before going.  After IV fluids BMP repeated again creatinine is coming down sodium is slightly low  Patient advised to follow-up with PCP by Monday to repeat kidney function     I spent  minutes in the professional and overall care of this patient.      Isabel Rodriguez MD

## 2024-09-12 NOTE — NURSING NOTE
Pt being discharged home. Pt has all belongings. IV removed. Discharge instructions explained and pt verbalized understanding. RN walked with pt downstairs to ensure he was able to get to his car.

## 2024-09-12 NOTE — DISCHARGE SUMMARY
Discharge Diagnosis  Nephrolithiasis    Issues Requiring Follow-Up  Acute kidney injury on chronic kidney injury  UTI    Test Results Pending At Discharge  Pending Labs       No current pending labs.            Hospital Course   Jaswinder Keenan is a 78 y.o. male presenting with hematuria.  [facility]  He decided to come to ER because he had episode of hematuria this morning.  He had little bit of abdominal pain as well.  He is having back pain for last 2 days.  In the emergency room CAT scan showed 14 mm stone right UPJ with moderate hydronephrosis with elevated white cell count and UA showing UTI   Patient had a stent placed.  Treated with antibiotic for UTI.  Had white cell count elevation which is coming down.  He had renal insufficiency after the stent placement with hydration started coming down.  Patient did not want to wait until its normalized.  He promises to follow-up with his PCP    Pertinent Physical Exam At Time of Discharge  Physical Exam  Vitals reviewed.   Constitutional:       Appearance: Normal appearance.   HENT:      Head: Normocephalic and atraumatic.      Right Ear: Tympanic membrane, ear canal and external ear normal.      Left Ear: Tympanic membrane, ear canal and external ear normal.      Nose: Nose normal.      Mouth/Throat:      Pharynx: Oropharynx is clear.   Eyes:      Extraocular Movements: Extraocular movements intact.      Conjunctiva/sclera: Conjunctivae normal.      Pupils: Pupils are equal, round, and reactive to light.   Cardiovascular:      Rate and Rhythm: Normal rate and regular rhythm.      Pulses: Normal pulses.      Heart sounds: Normal heart sounds.   Pulmonary:      Effort: Pulmonary effort is normal.      Breath sounds: Normal breath sounds.   Abdominal:      General: Abdomen is flat. Bowel sounds are normal.      Palpations: Abdomen is soft.   Musculoskeletal:      Cervical back: Normal range of motion and neck supple.   Skin:     General: Skin is warm  and dry.   Neurological:      General: No focal deficit present.      Mental Status: He is alert and oriented to person, place, and time.   Psychiatric:         Mood and Affect: Mood normal.         Home Medications     Medication List      START taking these medications     cephalexin 500 mg capsule; Commonly known as: Keflex; Take 1 capsule   (500 mg) by mouth 3 times a day.   tamsulosin 0.4 mg 24 hr capsule; Commonly known as: Flomax; Take 1   capsule (0.4 mg) by mouth once daily.; Start taking on: September 12, 2024     CONTINUE taking these medications     ascorbic acid 500 mg tablet; Commonly known as: Vitamin C   aspirin 81 mg EC tablet   atorvastatin 40 mg tablet; Commonly known as: Lipitor   clopidogrel 75 mg tablet; Commonly known as: Plavix   cyanocobalamin 1,000 mcg tablet; Commonly known as: Vitamin B-12   Fish OiL 1,000 mg (120 mg-180 mg) capsule; Generic drug: omega   3-dha-epa-fish oil   levothyroxine 112 mcg tablet; Commonly known as: Synthroid, Levoxyl   metoprolol succinate XL 50 mg 24 hr tablet; Commonly known as: Toprol-XL   nitroglycerin 0.4 mg SL tablet; Commonly known as: Nitrostat   PRESERVISION AREDS ORAL   zinc gluconate 50 mg tablet     STOP taking these medications     ramipril 2.5 mg capsule; Commonly known as: Altace       Outpatient Follow-Up  No future appointments.    Isabel Rodriguez MD

## 2024-09-20 ENCOUNTER — HOSPITAL ENCOUNTER (OUTPATIENT)
Dept: RADIOLOGY | Facility: HOSPITAL | Age: 79
Discharge: HOME | End: 2024-09-20
Payer: MEDICARE

## 2024-09-20 DIAGNOSIS — N20.0 CALCULUS OF KIDNEY: ICD-10-CM

## 2024-09-20 PROCEDURE — 74018 RADEX ABDOMEN 1 VIEW: CPT

## 2024-09-27 DIAGNOSIS — N20.0 KIDNEY STONE: Primary | ICD-10-CM

## 2024-10-24 ENCOUNTER — PRE-ADMISSION TESTING (OUTPATIENT)
Dept: PREADMISSION TESTING | Facility: HOSPITAL | Age: 79
End: 2024-10-24
Payer: MEDICARE

## 2024-10-24 ENCOUNTER — LAB (OUTPATIENT)
Dept: LAB | Facility: LAB | Age: 79
End: 2024-10-24
Payer: MEDICARE

## 2024-10-24 VITALS
TEMPERATURE: 98.6 F | HEART RATE: 76 BPM | OXYGEN SATURATION: 98 % | SYSTOLIC BLOOD PRESSURE: 127 MMHG | DIASTOLIC BLOOD PRESSURE: 87 MMHG | WEIGHT: 217.5 LBS | HEIGHT: 68 IN | BODY MASS INDEX: 32.96 KG/M2

## 2024-10-24 DIAGNOSIS — N20.0 KIDNEY STONE: ICD-10-CM

## 2024-10-24 DIAGNOSIS — E03.9 HYPOTHYROIDISM, UNSPECIFIED TYPE: ICD-10-CM

## 2024-10-24 DIAGNOSIS — Z01.818 PRE-OP EVALUATION: ICD-10-CM

## 2024-10-24 DIAGNOSIS — Z01.818 PRE-OP EVALUATION: Primary | ICD-10-CM

## 2024-10-24 LAB
T4 FREE SERPL-MCNC: 0.83 NG/DL (ref 0.61–1.12)
TSH SERPL-ACNC: 7.58 MIU/L (ref 0.44–3.98)

## 2024-10-24 PROCEDURE — 84439 ASSAY OF FREE THYROXINE: CPT

## 2024-10-24 PROCEDURE — 99203 OFFICE O/P NEW LOW 30 MIN: CPT

## 2024-10-24 PROCEDURE — 84443 ASSAY THYROID STIM HORMONE: CPT

## 2024-10-24 PROCEDURE — 36415 COLL VENOUS BLD VENIPUNCTURE: CPT

## 2024-10-24 RX ORDER — GLUCOSAM/CHON-MSM1/C/MANG/BOSW 750-644 MG
1 TABLET ORAL DAILY
COMMUNITY

## 2024-10-24 RX ORDER — IBUPROFEN 200 MG
1 TABLET ORAL DAILY
COMMUNITY

## 2024-10-24 RX ORDER — MILK THISTLE 150 MG
1 CAPSULE ORAL DAILY
COMMUNITY

## 2024-10-24 RX ORDER — UBIDECARENONE 30 MG
30 CAPSULE ORAL DAILY
COMMUNITY

## 2024-10-24 RX ORDER — RAMIPRIL 2.5 MG/1
2.5 CAPSULE ORAL DAILY
COMMUNITY

## 2024-10-24 ASSESSMENT — DUKE ACTIVITY SCORE INDEX (DASI)
CAN YOU HAVE SEXUAL RELATIONS: YES
CAN YOU DO HEAVY WORK AROUND THE HOUSE LIKE SCRUBBING FLOORS OR LIFTING AND MOVING HEAVY FURNITURE: YES
CAN YOU PARTICIPATE IN MODERATE RECREATIONAL ACTIVITIES LIKE GOLF, BOWLING, DANCING, DOUBLES TENNIS OR THROWING A BASEBALL OR FOOTBALL: NO
CAN YOU DO YARD WORK LIKE RAKING LEAVES, WEEDING OR PUSHING A MOWER: YES
CAN YOU DO LIGHT WORK AROUND THE HOUSE LIKE DUSTING OR WASHING DISHES: YES
CAN YOU WALK INDOORS, SUCH AS AROUND YOUR HOUSE: YES
CAN YOU CLIMB A FLIGHT OF STAIRS OR WALK UP A HILL: YES
CAN YOU DO MODERATE WORK AROUND THE HOUSE LIKE VACUUMING, SWEEPING FLOORS OR CARRYING GROCERIES: YES
CAN YOU TAKE CARE OF YOURSELF (EAT, DRESS, BATHE, OR USE TOILET): YES
CAN YOU PARTICIPATE IN STRENOUS SPORTS LIKE SWIMMING, SINGLES TENNIS, FOOTBALL, BASKETBALL, OR SKIING: NO
TOTAL_SCORE: 36.7
DASI METS SCORE: 7.3
CAN YOU RUN A SHORT DISTANCE: NO
CAN YOU WALK A BLOCK OR TWO ON LEVEL GROUND: YES

## 2024-10-24 ASSESSMENT — ENCOUNTER SYMPTOMS
HEMATOLOGIC/LYMPHATIC NEGATIVE: 1
EYES NEGATIVE: 1
CONSTITUTIONAL NEGATIVE: 1
RESPIRATORY NEGATIVE: 1
NEUROLOGICAL NEGATIVE: 1
ALLERGIC/IMMUNOLOGIC NEGATIVE: 1
FREQUENCY: 1
MUSCULOSKELETAL NEGATIVE: 1
GASTROINTESTINAL NEGATIVE: 1
HEMATURIA: 1
PSYCHIATRIC NEGATIVE: 1
ENDOCRINE NEGATIVE: 1

## 2024-10-24 ASSESSMENT — PAIN SCALES - GENERAL: PAINLEVEL_OUTOF10: 0 - NO PAIN

## 2024-10-24 ASSESSMENT — PAIN - FUNCTIONAL ASSESSMENT: PAIN_FUNCTIONAL_ASSESSMENT: 0-10

## 2024-10-24 NOTE — H&P (VIEW-ONLY)
CPM/PAT Evaluation       Name: Jaswinder Keenan (Jaswinder Keenan)  /Age: 1945/79 y.o.     In-Person       Chief Complaint: Kidney stone    HPI: Jaswinder Keenan is a very active 79 year old male that was seen in the ER 24 with flank pain and hematuria. Ct of the abdomen and pelvis showed   KIDNEYS AND URETERS:  A few simple fluid attenuation cortical cysts, otherwise unremarkable  left kidney without hydronephrosis. No left sided urinary tract  calculi are identified.    There is a 14 mm proximal right ureteral calculus just distal to the ureteropelvic junction with moderate upstream hydronephrosis and perinephric stranding. A few additional right intrarenal calculi measure up to 9 mm in the lower pole. There is an indeterminate exophytic lesion in the right upper pole measuring 15 mm/55 Hounsfield unit. An additional smaller indeterminate endophytic lesion is suspected in the interpolar cortex measuring 49 Hounsfield unit, margins somewhat ill-defined making size measurement difficult. A large exophytic simple fluid attenuation cyst in the lower pole is also noted.    He had cystoscopy with an insertion of a right ureter stent. He is scheduled to have a lithotripsy of the right renal stone and removal of the right ureter stent. He denies flank and abdominal pain. He denies pain when urinating. He states he does have blood in the urine. He denies fever, chills, nausea, vomiting, chest pain, sob, dizziness, and palpitations.    Past Medical History:   Diagnosis Date    BPH (benign prostatic hyperplasia)     Coronary artery disease     Hyperlipidemia     Hypertension     Hypothyroidism     NSTEMI (non-ST elevated myocardial infarction) (Multi) 2015       Past Surgical History:   Procedure Laterality Date    HERNIA REPAIR      OTHER SURGICAL HISTORY Left     leg and ankle surgery       Social History     Tobacco Use    Smoking status: Every Day     Current packs/day: 0.50     Average packs/day: 0.7  packs/day for 114.8 years (82.4 ttl pk-yrs)     Types: Cigarettes     Start date: 1960    Smokeless tobacco: Never   Substance Use Topics    Alcohol use: Not Currently     Social History     Substance and Sexual Activity   Drug Use Never         No family history on file.    Allergies   Allergen Reactions    Penicillins Hives     Current Outpatient Medications   Medication Sig Dispense Refill    ascorbic acid (Vitamin C) 500 mg tablet Take 1 tablet (500 mg) by mouth once daily.      aspirin 81 mg EC tablet Take 1 tablet (81 mg) by mouth once daily.      atorvastatin (Lipitor) 40 mg tablet Take 1 tablet (40 mg) by mouth once daily.      cholecalciferol, vitamin D3, (D3-2000 ORAL) Take 1 tablet by mouth once daily.      clopidogrel (Plavix) 75 mg tablet Take 1 tablet (75 mg) by mouth once daily.      co-enzyme Q-10 30 mg capsule Take 1 capsule (30 mg) by mouth once daily.      cyanocobalamin (Vitamin B-12) 1,000 mcg tablet Take 1 tablet (1,000 mcg) by mouth once daily.      elderberry fruit 350 mg capsule Take 1 capsule by mouth once daily.      xqpsogpx-rcko-[id number]-C-yousif-bosw (Osteo Bi-Flex Triple Strength) 750 mg-644 mg- 30 mg-1 mg tablet Take 1 capsule by mouth once daily.      lactobacillus acidophilus (Acidophilus) capsule Take 1 capsule by mouth once daily.      levothyroxine (Synthroid, Levoxyl) 112 mcg tablet Take 1 tablet (112 mcg) by mouth early in the morning.. Take on an empty stomach at the same time each day, either 30 to 60 minutes prior to breakfast      metoprolol succinate XL (Toprol-XL) 50 mg 24 hr tablet Take 1 tablet (50 mg) by mouth once daily. Do not crush or chew.      multivitamin with minerals iron-free (Centrum Silver) Take 1 tablet by mouth once daily.      nitroglycerin (Nitrostat) 0.4 mg SL tablet Place 1 tablet (0.4 mg) under the tongue every 5 minutes if needed for chest pain.      omega 3-dha-epa-fish oil (Fish OiL) 1,000 mg (120 mg-180 mg) capsule Take 1 capsule (1,000 mg) by mouth 2  times a day.      ramipril (Altace) 2.5 mg capsule Take 1 capsule (2.5 mg) by mouth once daily.      vit A/vit C/vit E/zinc/copper (PRESERVISION AREDS ORAL) Take 1 each by mouth 2 times a day.      zinc gluconate 50 mg tablet Take 1 tablet (50 mg of elemental zinc) by mouth once daily.       No current facility-administered medications for this visit.       Review of Systems   Constitutional: Negative.    HENT: Negative.     Eyes: Negative.    Respiratory: Negative.     Cardiovascular:  Positive for leg swelling (left leg).   Gastrointestinal: Negative.    Endocrine: Negative.    Genitourinary:  Positive for frequency, hematuria and urgency.   Musculoskeletal: Negative.    Skin: Negative.    Allergic/Immunologic: Negative.    Neurological: Negative.    Hematological: Negative.    Psychiatric/Behavioral: Negative.                Physical Exam  Vitals reviewed.   Constitutional:       Appearance: Normal appearance.   HENT:      Head: Normocephalic and atraumatic.      Nose: Nose normal.      Mouth/Throat:      Mouth: Mucous membranes are moist.      Pharynx: Oropharynx is clear.   Eyes:      Extraocular Movements: Extraocular movements intact.      Conjunctiva/sclera: Conjunctivae normal.   Cardiovascular:      Rate and Rhythm: Normal rate and regular rhythm.      Pulses: Normal pulses.      Heart sounds: Normal heart sounds.   Pulmonary:      Effort: Pulmonary effort is normal.      Breath sounds: Normal breath sounds.   Abdominal:      General: Bowel sounds are normal.      Palpations: Abdomen is soft.   Genitourinary:     Comments: Assessment deferred to physician    Musculoskeletal:         General: Normal range of motion.      Cervical back: Normal range of motion and neck supple.      Left lower leg: Edema present.   Skin:     General: Skin is warm and dry.   Neurological:      General: No focal deficit present.      Mental Status: He is alert and oriented to person, place, and time.   Psychiatric:         Mood  "and Affect: Mood normal.         Behavior: Behavior normal.         Thought Content: Thought content normal.         Judgment: Judgment normal.          PAT AIRWAY:   Airway:     Mallampati::  II    TM distance::  >3 FB    Neck ROM::  Full  normal      /87   Pulse 76   Temp 37 °C (98.6 °F) (Temporal)   Ht 1.727 m (5' 8\")   Wt 98.7 kg (217 lb 8 oz)   SpO2 98%   BMI 33.07 kg/m²     ASA:III  OFELIA:4.0%  RCRI:0.9%      DASI Risk Score      Flowsheet Row Pre-Admission Testing from 10/24/2024 in [facility]   Can you take care of yourself (eat, dress, bathe, or use toilet)?  2.75 filed at 10/24/2024 1014   Can you walk indoors, such as around your house? 1.75 filed at 10/24/2024 1014   Can you walk a block or two on level ground?  2.75 filed at 10/24/2024 1014   Can you climb a flight of stairs or walk up a hill? 5.5 filed at 10/24/2024 1014   Can you run a short distance? 0 filed at 10/24/2024 1014   Can you do light work around the house like dusting or washing dishes? 2.7 filed at 10/24/2024 1014   Can you do moderate work around the house like vacuuming, sweeping floors or carrying groceries? 3.5 filed at 10/24/2024 1014   Can you do heavy work around the house like scrubbing floors or lifting and moving heavy furniture?  8 filed at 10/24/2024 1014   Can you do yard work like raking leaves, weeding or pushing a mower? 4.5 filed at 10/24/2024 1014   Can you have sexual relations? 5.25 filed at 10/24/2024 1014   Can you participate in moderate recreational activities like golf, bowling, dancing, doubles tennis or throwing a baseball or football? 0 filed at 10/24/2024 1014   Can you participate in strenous sports like swimming, singles tennis, football, basketball, or skiing? 0 filed at 10/24/2024 1014   DASI SCORE 36.7 filed at 10/24/2024 1014   METS Score (Will be calculated only when all the questions are answered) 7.3 filed at 10/24/2024 1014          Caprini DVT Assessment      Flowsheet " Row ED to Hosp-Admission (Discharged) from 9/9/2024 in [facility] with Isabel Rodriguez MD and Merlin Friedman MD   DVT Score 7 filed at 09/09/2024 1429   BMI 31-40 (Obesity) filed at 09/09/2024 1429   RETIRED: Current Status Medical patient at bedrest filed at 09/09/2024 1429   RETIRED: Age Over 75 years filed at 09/09/2024 1429          Modified Frailty Index    No data to display       CHADS2 Stroke Risk  Current as of 13 minutes ago        N/A 3 to 100%: High Risk   2 to < 3%: Medium Risk   0 to < 2%: Low Risk     Last Change: N/A          This score determines the patient's risk of having a stroke if the patient has atrial fibrillation.        This score is not applicable to this patient. Components are not calculated.          Revised Cardiac Risk Index      Flowsheet Row Pre-Admission Testing from 10/24/2024 in [facility]   High-Risk Surgery (Intraperitoneal, Intrathoracic,Suprainguinal vascular) 0 filed at 10/24/2024 1058   History of ischemic heart disease (History of MI, History of positive exercuse test, Current chest paint considered due to myocardial ischemia, Use of nitrate therapy, ECG with pathological Q Waves) 1 filed at 10/24/2024 1058   History of congestive heart failure (pulmonary edemia, bilateral rales or S3 gallop, Paroxysmal nocturnal dyspnea, CXR showing pulmonary vascular redistribution) 0 filed at 10/24/2024 1058   History of cerebrovascular disease (Prior TIA or stroke) 0 filed at 10/24/2024 1058   Pre-operative insulin treatment 0 filed at 10/24/2024 1058   Pre-operative creatinine>2 mg/dl 0 filed at 10/24/2024 1058   Revised Cardiac Risk Calculator 1 filed at 10/24/2024 1058          Apfel Simplified Score    No data to display       Risk Analysis Index Results This Encounter    No data found in the last 10 encounters.       Stop Bang Score      Flowsheet Row Pre-Admission Testing from 10/24/2024 in [facility]   Do you snore loudly? 1  filed at 10/24/2024 1014   Do you often feel tired or fatigued after your sleep? 0 filed at 10/24/2024 1014   Has anyone ever observed you stop breathing in your sleep? 0 filed at 10/24/2024 1014   Do you have or are you being treated for high blood pressure? 1 filed at 10/24/2024 1014   Recent BMI (Calculated) 33.1 filed at 10/24/2024 1014   Is BMI greater than 35 kg/m2? 0=No filed at 10/24/2024 1014   Age older than 50 years old? 1=Yes filed at 10/24/2024 1014   Is your neck circumference greater than 17 inches (Male) or 16 inches (Female)? 0 filed at 10/24/2024 1014   Gender - Male 1=Yes filed at 10/24/2024 1014   STOP-BANG Total Score 4 filed at 10/24/2024 1014          Prodigy: High Risk  Total Score: 20              Prodigy Age Score      Prodigy Gender Score          ARISCAT Score for Postoperative Pulmonary Complications    No data to display       Romano Perioperative Risk for Myocardial Infarction or Cardiac Arrest (TRAVON)    No data to display         Assessment and Plan:     Calculus of kidney : Lithotripsy Extracorporeal Shock Wave , Cystoscopy with Removal Stent Ureter (RT ESWL AND STENT REMOVAL     Hypertension  Hyperlipidemia  CAD: Hx of a NSTEMI 2015. Patient had no stent placed. Patient takes a daily aspirin and Plavix. Patient follows Dr. Hendricks Cardiology at [facility]. Last visit 6/27/24. Patient will confirm with Dr. Hendricks when to stop Plavix.   Hypothyroid: Levothyroxine. TSH ordered in PAT  BMI: 33.07      EKG 9/10/24  LABS 9/11/24, TSH collected in PAT  Stress Test 7/10/23  CONCLUSIONS:    1. SPECT Perfusion Study: Normal.    2. There is no scintigraphic evidence for inducible ischemia.    3. No evidence of scarred myocardium.    4. Left ventricle is normal in size. The left ventricle systolic   function is normal.    5. This is a low risk scan.             Gated Stress FBP Gated Rest FBP    LVEF % 73               61       KRISTOFER Luo-CNP    Addendum 10/25/24  TSH was 7.58 but  Thyroxine free was normal at 0.83  Abiola Mnotoya, APRN-CNP

## 2024-10-24 NOTE — PREPROCEDURE INSTRUCTIONS
Medication List            Accurate as of October 24, 2024 10:18 AM. Always use your most recent med list.                Acidophilus capsule  Generic drug: lactobacillus acidophilus  Additional Medication Adjustments for Surgery: Take last dose 7 days before surgery     ascorbic acid 500 mg tablet  Commonly known as: Vitamin C  Additional Medication Adjustments for Surgery: Take last dose 7 days before surgery     aspirin 81 mg EC tablet  Additional Medication Adjustments for Surgery: Take last dose 5 days before surgery     atorvastatin 40 mg tablet  Commonly known as: Lipitor  Medication Adjustments for Surgery: Take/Use as prescribed     clopidogrel 75 mg tablet  Commonly known as: Plavix  Additional Medication Adjustments for Surgery: Take last dose 5 days before surgery  Notes to patient: PLEASE CONSULT WITH YOUR CARDIOLOGIST REGARDING STOPPING THE PLAVIX FOR YOUR SURGERY     co-enzyme Q-10 30 mg capsule  Additional Medication Adjustments for Surgery: Take last dose 7 days before surgery     cyanocobalamin 1,000 mcg tablet  Commonly known as: Vitamin B-12  Additional Medication Adjustments for Surgery: Take last dose 7 days before surgery     D3-2000 ORAL  Additional Medication Adjustments for Surgery: Take last dose 7 days before surgery     elderberry fruit 350 mg capsule  Additional Medication Adjustments for Surgery: Take last dose 7 days before surgery     Fish OiL 1,000 (120-180) mg capsule  Generic drug: omega 3-dha-epa-fish oil  Additional Medication Adjustments for Surgery: Take last dose 7 days before surgery     levothyroxine 112 mcg tablet  Commonly known as: Synthroid, Levoxyl  Medication Adjustments for Surgery: Take on the morning of surgery     metoprolol succinate XL 50 mg 24 hr tablet  Commonly known as: Toprol-XL  Medication Adjustments for Surgery: Take on the morning of surgery     multivitamin with minerals iron-free  Commonly known as: Centrum Silver  Additional Medication Adjustments  for Surgery: Take last dose 7 days before surgery     nitroglycerin 0.4 mg SL tablet  Commonly known as: Nitrostat  Medication Adjustments for Surgery: Take/Use as prescribed     Osteo Bi-Flex Triple Strength 750 mg-644 mg- 30 mg-1 mg tablet  Generic drug: czidijfv-jcuw-jtn1-C-yousif-bosw  Additional Medication Adjustments for Surgery: Take last dose 7 days before surgery     PRESERVISION AREDS ORAL  Additional Medication Adjustments for Surgery: Take last dose 7 days before surgery     ramipril 2.5 mg capsule  Commonly known as: Altace  Medication Adjustments for Surgery: Do Not take on the morning of surgery     zinc gluconate 50 mg tablet  Additional Medication Adjustments for Surgery: Take last dose 7 days before surgery                              NPO Instructions:    Do not eat any food after midnight the night before your surgery/procedure.    Additional Instructions:     Day of Surgery:  Review your medication instructions, take indicated medications  Wear  comfortable loose fitting clothing  Do not use moisturizers, creams, lotions or perfume  All jewelry and valuables should be left at home          Why must I stop eating and drinking near surgery time?  With sedation, food or liquid in your stomach can enter your lungs causing serious complications  Increases nausea and vomiting    When do I need to stop eating and drinking before my surgery?   Do not eat or drink after midnight the night before your surgery/procedure.  You may have small sips of water to take your medication.    PAT DISCHARGE INSTRUCTIONS    Please call the Same Day Surgery (SDS) Department of the hospital where your procedure will be performed after 2:00 PM the day before your surgery. If you are scheduled on a Monday, or a Tuesday following a Monday holiday, you will need to call on the last business day prior to your surgery.      [facility]  [address] [place]  [address]  [phone]    Please let your surgeon know if:      You develop any open sores, shingles, burning or painful urination as these may increase your risk of an infection.   You no longer wish to have the surgery.   Any other personal circumstances change that may lead to the need to cancel or defer this surgery-such as being sick or getting admitted to any hospital within one week of your planned procedure.    Your contact details change, such as a change of address or phone number.    Starting now:     Please DO NOT drink alcohol or smoke for 24 hours before surgery. It is well known that quitting smoking can make a huge difference to your health and recovery from surgery. The longer you abstain from smoking, the better your chances of a healthy recovery. If you need help with quitting, call 2-800-QUIT-NOW to be connected to a trained counselor who will discuss the best methods to help you quit.     Before your surgery:    Please stop all supplements 7 days prior to surgery. Or as directed by your surgeon.   Please stop taking NSAID pain medicine such as Advil and Motrin 7 days before surgery.    If you develop any fever, cough, cold, rashes, cuts, scratches, scrapes, urinary symptoms or infection anywhere on your body (including teeth and gums) prior to surgery, please call your surgeon’s office as soon as possible. This may require treatment to reduce the chance of cancellation on the day of surgery.    The day before your surgery:   DIET- Please follow the diet instructions at the top of your packet.   Get a good night’s rest.  Use the special soap for bathing if you have been instructed to use one.    Scheduled surgery times may change and you will be notified if this occurs - please check your personal voicemail for any updates.     On the morning of surgery:   Wear comfortable, loose fitting clothes which open in the front. Please do not wear moisturizers, creams, lotions, makeup or perfume.    Please  bring with you to surgery:   Photo ID and insurance card   Current list of medicines and allergies   Pacemaker/ Defibrillator/Heart stent cards   CPAP machine and mask    Slings/ splints/ crutches   A copy of your complete advanced directive/DHPOA.    Please do NOT bring with you to surgery:   All jewelry and valuables should be left at home.   Prosthetic devices such as contact lenses, hearing aids, dentures, eyelash extensions, hairpins and body piercings must be removed prior to going in to the surgical suite.    After outpatient surgery:   A responsible adult MUST accompany you at the time of discharge and stay with you for 24 hours after your surgery. You may NOT drive yourself home after surgery.    Do not drive, operate machinery, make critical decisions or do activities that require co-ordination or balance until after a night’s sleep.   Do not drink alcoholic beverages for 24 hours.   Instructions for resuming your medications will be provided by your surgeon.    CALL YOUR DOCTOR AFTER SURGERY IF YOU HAVE:     Chills and/or a fever of 101° F or higher.    Redness, swelling, pus or drainage from your surgical wound or a bad smell from the wound.    Lightheadedness, fainting or confusion.    Persistent vomiting (throwing up) and are not able to eat or drink for 12 hours.    Three or more loose, watery bowel movements in 24 hours (diarrhea).   Difficulty or pain while urinating( after non-urological surgery)    Pain and swelling in your legs, especially if it is only on one side.    Difficulty breathing or are breathing faster than normal.    Any new concerning symptoms.

## 2024-10-24 NOTE — CPM/PAT H&P
CPM/PAT Evaluation       Name: Jaswinder Keenan (Jaswinder Keenan)  /Age: 1945/79 y.o.     In-Person       Chief Complaint: Kidney stone    HPI: Jaswinder Keenan is a very active 79 year old male that was seen in the ER 24 with flank pain and hematuria. Ct of the abdomen and pelvis showed   KIDNEYS AND URETERS:  A few simple fluid attenuation cortical cysts, otherwise unremarkable  left kidney without hydronephrosis. No left sided urinary tract  calculi are identified.    There is a 14 mm proximal right ureteral calculus just distal to the ureteropelvic junction with moderate upstream hydronephrosis and perinephric stranding. A few additional right intrarenal calculi measure up to 9 mm in the lower pole. There is an indeterminate exophytic lesion in the right upper pole measuring 15 mm/55 Hounsfield unit. An additional smaller indeterminate endophytic lesion is suspected in the interpolar cortex measuring 49 Hounsfield unit, margins somewhat ill-defined making size measurement difficult. A large exophytic simple fluid attenuation cyst in the lower pole is also noted.    He had cystoscopy with an insertion of a right ureter stent. He is scheduled to have a lithotripsy of the right renal stone and removal of the right ureter stent. He denies flank and abdominal pain. He denies pain when urinating. He states he does have blood in the urine. He denies fever, chills, nausea, vomiting, chest pain, sob, dizziness, and palpitations.    Past Medical History:   Diagnosis Date    BPH (benign prostatic hyperplasia)     Coronary artery disease     Hyperlipidemia     Hypertension     Hypothyroidism     NSTEMI (non-ST elevated myocardial infarction) (Multi) 2015       Past Surgical History:   Procedure Laterality Date    HERNIA REPAIR      OTHER SURGICAL HISTORY Left     leg and ankle surgery       Social History     Tobacco Use    Smoking status: Every Day     Current packs/day: 0.50     Average packs/day: 0.7  packs/day for 114.8 years (82.4 ttl pk-yrs)     Types: Cigarettes     Start date: 1960    Smokeless tobacco: Never   Substance Use Topics    Alcohol use: Not Currently     Social History     Substance and Sexual Activity   Drug Use Never         No family history on file.    Allergies   Allergen Reactions    Penicillins Hives     Current Outpatient Medications   Medication Sig Dispense Refill    ascorbic acid (Vitamin C) 500 mg tablet Take 1 tablet (500 mg) by mouth once daily.      aspirin 81 mg EC tablet Take 1 tablet (81 mg) by mouth once daily.      atorvastatin (Lipitor) 40 mg tablet Take 1 tablet (40 mg) by mouth once daily.      cholecalciferol, vitamin D3, (D3-2000 ORAL) Take 1 tablet by mouth once daily.      clopidogrel (Plavix) 75 mg tablet Take 1 tablet (75 mg) by mouth once daily.      co-enzyme Q-10 30 mg capsule Take 1 capsule (30 mg) by mouth once daily.      cyanocobalamin (Vitamin B-12) 1,000 mcg tablet Take 1 tablet (1,000 mcg) by mouth once daily.      elderberry fruit 350 mg capsule Take 1 capsule by mouth once daily.      ybtgblvh-zmyb-mxd9-C-yousif-bosw (Osteo Bi-Flex Triple Strength) 750 mg-644 mg- 30 mg-1 mg tablet Take 1 capsule by mouth once daily.      lactobacillus acidophilus (Acidophilus) capsule Take 1 capsule by mouth once daily.      levothyroxine (Synthroid, Levoxyl) 112 mcg tablet Take 1 tablet (112 mcg) by mouth early in the morning.. Take on an empty stomach at the same time each day, either 30 to 60 minutes prior to breakfast      metoprolol succinate XL (Toprol-XL) 50 mg 24 hr tablet Take 1 tablet (50 mg) by mouth once daily. Do not crush or chew.      multivitamin with minerals iron-free (Centrum Silver) Take 1 tablet by mouth once daily.      nitroglycerin (Nitrostat) 0.4 mg SL tablet Place 1 tablet (0.4 mg) under the tongue every 5 minutes if needed for chest pain.      omega 3-dha-epa-fish oil (Fish OiL) 1,000 mg (120 mg-180 mg) capsule Take 1 capsule (1,000 mg) by mouth 2  times a day.      ramipril (Altace) 2.5 mg capsule Take 1 capsule (2.5 mg) by mouth once daily.      vit A/vit C/vit E/zinc/copper (PRESERVISION AREDS ORAL) Take 1 each by mouth 2 times a day.      zinc gluconate 50 mg tablet Take 1 tablet (50 mg of elemental zinc) by mouth once daily.       No current facility-administered medications for this visit.       Review of Systems   Constitutional: Negative.    HENT: Negative.     Eyes: Negative.    Respiratory: Negative.     Cardiovascular:  Positive for leg swelling (left leg).   Gastrointestinal: Negative.    Endocrine: Negative.    Genitourinary:  Positive for frequency, hematuria and urgency.   Musculoskeletal: Negative.    Skin: Negative.    Allergic/Immunologic: Negative.    Neurological: Negative.    Hematological: Negative.    Psychiatric/Behavioral: Negative.                Physical Exam  Vitals reviewed.   Constitutional:       Appearance: Normal appearance.   HENT:      Head: Normocephalic and atraumatic.      Nose: Nose normal.      Mouth/Throat:      Mouth: Mucous membranes are moist.      Pharynx: Oropharynx is clear.   Eyes:      Extraocular Movements: Extraocular movements intact.      Conjunctiva/sclera: Conjunctivae normal.   Cardiovascular:      Rate and Rhythm: Normal rate and regular rhythm.      Pulses: Normal pulses.      Heart sounds: Normal heart sounds.   Pulmonary:      Effort: Pulmonary effort is normal.      Breath sounds: Normal breath sounds.   Abdominal:      General: Bowel sounds are normal.      Palpations: Abdomen is soft.   Genitourinary:     Comments: Assessment deferred to physician    Musculoskeletal:         General: Normal range of motion.      Cervical back: Normal range of motion and neck supple.      Left lower leg: Edema present.   Skin:     General: Skin is warm and dry.   Neurological:      General: No focal deficit present.      Mental Status: He is alert and oriented to person, place, and time.   Psychiatric:         Mood  "and Affect: Mood normal.         Behavior: Behavior normal.         Thought Content: Thought content normal.         Judgment: Judgment normal.          PAT AIRWAY:   Airway:     Mallampati::  II    TM distance::  >3 FB    Neck ROM::  Full  normal      /87   Pulse 76   Temp 37 °C (98.6 °F) (Temporal)   Ht 1.727 m (5' 8\")   Wt 98.7 kg (217 lb 8 oz)   SpO2 98%   BMI 33.07 kg/m²     ASA:III  OFELIA:4.0%  RCRI:0.9%      DASI Risk Score      Flowsheet Row Pre-Admission Testing from 10/24/2024 in [facility]   Can you take care of yourself (eat, dress, bathe, or use toilet)?  2.75 filed at 10/24/2024 1014   Can you walk indoors, such as around your house? 1.75 filed at 10/24/2024 1014   Can you walk a block or two on level ground?  2.75 filed at 10/24/2024 1014   Can you climb a flight of stairs or walk up a hill? 5.5 filed at 10/24/2024 1014   Can you run a short distance? 0 filed at 10/24/2024 1014   Can you do light work around the house like dusting or washing dishes? 2.7 filed at 10/24/2024 1014   Can you do moderate work around the house like vacuuming, sweeping floors or carrying groceries? 3.5 filed at 10/24/2024 1014   Can you do heavy work around the house like scrubbing floors or lifting and moving heavy furniture?  8 filed at 10/24/2024 1014   Can you do yard work like raking leaves, weeding or pushing a mower? 4.5 filed at 10/24/2024 1014   Can you have sexual relations? 5.25 filed at 10/24/2024 1014   Can you participate in moderate recreational activities like golf, bowling, dancing, doubles tennis or throwing a baseball or football? 0 filed at 10/24/2024 1014   Can you participate in strenous sports like swimming, singles tennis, football, basketball, or skiing? 0 filed at 10/24/2024 1014   DASI SCORE 36.7 filed at 10/24/2024 1014   METS Score (Will be calculated only when all the questions are answered) 7.3 filed at 10/24/2024 1014          Caprini DVT Assessment      Flowsheet " Row ED to Hosp-Admission (Discharged) from 9/9/2024 in [facility] with Isabel Rodriguez MD and Merlin Friedman MD   DVT Score 7 filed at 09/09/2024 1429   BMI 31-40 (Obesity) filed at 09/09/2024 1429   RETIRED: Current Status Medical patient at bedrest filed at 09/09/2024 1429   RETIRED: Age Over 75 years filed at 09/09/2024 1429          Modified Frailty Index    No data to display       CHADS2 Stroke Risk  Current as of 13 minutes ago        N/A 3 to 100%: High Risk   2 to < 3%: Medium Risk   0 to < 2%: Low Risk     Last Change: N/A          This score determines the patient's risk of having a stroke if the patient has atrial fibrillation.        This score is not applicable to this patient. Components are not calculated.          Revised Cardiac Risk Index      Flowsheet Row Pre-Admission Testing from 10/24/2024 in [facility]   High-Risk Surgery (Intraperitoneal, Intrathoracic,Suprainguinal vascular) 0 filed at 10/24/2024 1058   History of ischemic heart disease (History of MI, History of positive exercuse test, Current chest paint considered due to myocardial ischemia, Use of nitrate therapy, ECG with pathological Q Waves) 1 filed at 10/24/2024 1058   History of congestive heart failure (pulmonary edemia, bilateral rales or S3 gallop, Paroxysmal nocturnal dyspnea, CXR showing pulmonary vascular redistribution) 0 filed at 10/24/2024 1058   History of cerebrovascular disease (Prior TIA or stroke) 0 filed at 10/24/2024 1058   Pre-operative insulin treatment 0 filed at 10/24/2024 1058   Pre-operative creatinine>2 mg/dl 0 filed at 10/24/2024 1058   Revised Cardiac Risk Calculator 1 filed at 10/24/2024 1058          Apfel Simplified Score    No data to display       Risk Analysis Index Results This Encounter    No data found in the last 10 encounters.       Stop Bang Score      Flowsheet Row Pre-Admission Testing from 10/24/2024 in [facility]   Do you snore loudly? 1  filed at 10/24/2024 1014   Do you often feel tired or fatigued after your sleep? 0 filed at 10/24/2024 1014   Has anyone ever observed you stop breathing in your sleep? 0 filed at 10/24/2024 1014   Do you have or are you being treated for high blood pressure? 1 filed at 10/24/2024 1014   Recent BMI (Calculated) 33.1 filed at 10/24/2024 1014   Is BMI greater than 35 kg/m2? 0=No filed at 10/24/2024 1014   Age older than 50 years old? 1=Yes filed at 10/24/2024 1014   Is your neck circumference greater than 17 inches (Male) or 16 inches (Female)? 0 filed at 10/24/2024 1014   Gender - Male 1=Yes filed at 10/24/2024 1014   STOP-BANG Total Score 4 filed at 10/24/2024 1014          Prodigy: High Risk  Total Score: 20              Prodigy Age Score      Prodigy Gender Score          ARISCAT Score for Postoperative Pulmonary Complications    No data to display       Romano Perioperative Risk for Myocardial Infarction or Cardiac Arrest (TRAVON)    No data to display         Assessment and Plan:     Calculus of kidney : Lithotripsy Extracorporeal Shock Wave , Cystoscopy with Removal Stent Ureter (RT ESWL AND STENT REMOVAL     Hypertension  Hyperlipidemia  CAD: Hx of a NSTEMI 2015. Patient had no stent placed. Patient takes a daily aspirin and Plavix. Patient follows Dr. Hendricks Cardiology at [facility]. Last visit 6/27/24. Patient will confirm with Dr. Hendricks when to stop Plavix.   Hypothyroid: Levothyroxine. TSH ordered in PAT  BMI: 33.07      EKG 9/10/24  LABS 9/11/24, TSH collected in PAT  Stress Test 7/10/23  CONCLUSIONS:    1. SPECT Perfusion Study: Normal.    2. There is no scintigraphic evidence for inducible ischemia.    3. No evidence of scarred myocardium.    4. Left ventricle is normal in size. The left ventricle systolic   function is normal.    5. This is a low risk scan.             Gated Stress FBP Gated Rest FBP    LVEF % 73               61       KRISTOFER Luo-CNP    Addendum 10/25/24  TSH was 7.58 but  Thyroxine free was normal at 0.83  Abiola Montoya, APRN-CNP

## 2024-10-30 NOTE — PERIOPERATIVE NURSING NOTE
Patient advised to consult cardiologist regarding anticoagulation.  Patient instructed by cardiology to stop Plavix but continue low dose aspirin 81 mg.

## 2024-11-14 ENCOUNTER — HOSPITAL ENCOUNTER (OUTPATIENT)
Facility: HOSPITAL | Age: 79
Setting detail: OUTPATIENT SURGERY
Discharge: HOME | End: 2024-11-14
Attending: UROLOGY | Admitting: UROLOGY
Payer: MEDICARE

## 2024-11-14 ENCOUNTER — ANESTHESIA (OUTPATIENT)
Dept: OPERATING ROOM | Facility: HOSPITAL | Age: 79
End: 2024-11-14
Payer: MEDICARE

## 2024-11-14 ENCOUNTER — ANESTHESIA EVENT (OUTPATIENT)
Dept: OPERATING ROOM | Facility: HOSPITAL | Age: 79
End: 2024-11-14
Payer: MEDICARE

## 2024-11-14 VITALS
HEART RATE: 66 BPM | HEIGHT: 67 IN | WEIGHT: 214.51 LBS | BODY MASS INDEX: 33.67 KG/M2 | SYSTOLIC BLOOD PRESSURE: 142 MMHG | RESPIRATION RATE: 18 BRPM | TEMPERATURE: 97.2 F | OXYGEN SATURATION: 98 % | DIASTOLIC BLOOD PRESSURE: 76 MMHG

## 2024-11-14 PROCEDURE — 7100000001 HC RECOVERY ROOM TIME - INITIAL BASE CHARGE: Performed by: UROLOGY

## 2024-11-14 PROCEDURE — 2500000004 HC RX 250 GENERAL PHARMACY W/ HCPCS (ALT 636 FOR OP/ED): Performed by: NURSE ANESTHETIST, CERTIFIED REGISTERED

## 2024-11-14 PROCEDURE — A50590 PR FRAGMENT KIDNEY STONE/ ESWL: Performed by: NURSE ANESTHETIST, CERTIFIED REGISTERED

## 2024-11-14 PROCEDURE — 3700000001 HC GENERAL ANESTHESIA TIME - INITIAL BASE CHARGE: Performed by: UROLOGY

## 2024-11-14 PROCEDURE — 2500000004 HC RX 250 GENERAL PHARMACY W/ HCPCS (ALT 636 FOR OP/ED): Mod: JZ | Performed by: UROLOGY

## 2024-11-14 PROCEDURE — 99100 ANES PT EXTEME AGE<1 YR&>70: CPT | Performed by: ANESTHESIOLOGY

## 2024-11-14 PROCEDURE — 7100000009 HC PHASE TWO TIME - INITIAL BASE CHARGE: Performed by: UROLOGY

## 2024-11-14 PROCEDURE — A50590 PR FRAGMENT KIDNEY STONE/ ESWL: Performed by: ANESTHESIOLOGY

## 2024-11-14 PROCEDURE — 7100000010 HC PHASE TWO TIME - EACH INCREMENTAL 1 MINUTE: Performed by: UROLOGY

## 2024-11-14 PROCEDURE — 3600000003 HC OR TIME - INITIAL BASE CHARGE - PROCEDURE LEVEL THREE: Performed by: UROLOGY

## 2024-11-14 PROCEDURE — 7100000002 HC RECOVERY ROOM TIME - EACH INCREMENTAL 1 MINUTE: Performed by: UROLOGY

## 2024-11-14 PROCEDURE — 3700000002 HC GENERAL ANESTHESIA TIME - EACH INCREMENTAL 1 MINUTE: Performed by: UROLOGY

## 2024-11-14 PROCEDURE — 3600000008 HC OR TIME - EACH INCREMENTAL 1 MINUTE - PROCEDURE LEVEL THREE: Performed by: UROLOGY

## 2024-11-14 RX ORDER — ONDANSETRON HYDROCHLORIDE 2 MG/ML
4 INJECTION, SOLUTION INTRAVENOUS ONCE AS NEEDED
Status: DISCONTINUED | OUTPATIENT
Start: 2024-11-14 | End: 2024-11-14 | Stop reason: HOSPADM

## 2024-11-14 RX ORDER — CEFAZOLIN SODIUM 2 G/100ML
2 INJECTION, SOLUTION INTRAVENOUS ONCE
Status: COMPLETED | OUTPATIENT
Start: 2024-11-14 | End: 2024-11-14

## 2024-11-14 RX ORDER — LIDOCAINE HYDROCHLORIDE 10 MG/ML
INJECTION, SOLUTION EPIDURAL; INFILTRATION; INTRACAUDAL; PERINEURAL AS NEEDED
Status: DISCONTINUED | OUTPATIENT
Start: 2024-11-14 | End: 2024-11-14

## 2024-11-14 RX ORDER — HYDRALAZINE HYDROCHLORIDE 20 MG/ML
5 INJECTION INTRAMUSCULAR; INTRAVENOUS EVERY 30 MIN PRN
Status: DISCONTINUED | OUTPATIENT
Start: 2024-11-14 | End: 2024-11-14 | Stop reason: HOSPADM

## 2024-11-14 RX ORDER — FENTANYL CITRATE 50 UG/ML
50 INJECTION, SOLUTION INTRAMUSCULAR; INTRAVENOUS EVERY 5 MIN PRN
Status: DISCONTINUED | OUTPATIENT
Start: 2024-11-14 | End: 2024-11-14 | Stop reason: HOSPADM

## 2024-11-14 RX ORDER — SODIUM CHLORIDE, SODIUM LACTATE, POTASSIUM CHLORIDE, CALCIUM CHLORIDE 600; 310; 30; 20 MG/100ML; MG/100ML; MG/100ML; MG/100ML
INJECTION, SOLUTION INTRAVENOUS CONTINUOUS PRN
Status: DISCONTINUED | OUTPATIENT
Start: 2024-11-14 | End: 2024-11-14

## 2024-11-14 RX ORDER — FENTANYL CITRATE 50 UG/ML
INJECTION, SOLUTION INTRAMUSCULAR; INTRAVENOUS AS NEEDED
Status: DISCONTINUED | OUTPATIENT
Start: 2024-11-14 | End: 2024-11-14

## 2024-11-14 RX ORDER — MEPERIDINE HYDROCHLORIDE 25 MG/ML
12.5 INJECTION INTRAMUSCULAR; INTRAVENOUS; SUBCUTANEOUS EVERY 10 MIN PRN
Status: DISCONTINUED | OUTPATIENT
Start: 2024-11-14 | End: 2024-11-14 | Stop reason: HOSPADM

## 2024-11-14 RX ORDER — HYDROMORPHONE HYDROCHLORIDE 0.2 MG/ML
0.2 INJECTION INTRAMUSCULAR; INTRAVENOUS; SUBCUTANEOUS EVERY 5 MIN PRN
Status: DISCONTINUED | OUTPATIENT
Start: 2024-11-14 | End: 2024-11-14 | Stop reason: HOSPADM

## 2024-11-14 RX ORDER — PROPOFOL 10 MG/ML
INJECTION, EMULSION INTRAVENOUS AS NEEDED
Status: DISCONTINUED | OUTPATIENT
Start: 2024-11-14 | End: 2024-11-14

## 2024-11-14 RX ORDER — ALBUTEROL SULFATE 0.83 MG/ML
2.5 SOLUTION RESPIRATORY (INHALATION) ONCE AS NEEDED
Status: DISCONTINUED | OUTPATIENT
Start: 2024-11-14 | End: 2024-11-14 | Stop reason: HOSPADM

## 2024-11-14 RX ORDER — LABETALOL HYDROCHLORIDE 5 MG/ML
5 INJECTION, SOLUTION INTRAVENOUS ONCE AS NEEDED
Status: DISCONTINUED | OUTPATIENT
Start: 2024-11-14 | End: 2024-11-14 | Stop reason: HOSPADM

## 2024-11-14 SDOH — HEALTH STABILITY: MENTAL HEALTH: CURRENT SMOKER: 1

## 2024-11-14 ASSESSMENT — PAIN SCALES - GENERAL
PAINLEVEL_OUTOF10: 0 - NO PAIN
PAIN_LEVEL: 0
PAINLEVEL_OUTOF10: 0 - NO PAIN
PAINLEVEL_OUTOF10: 0 - NO PAIN

## 2024-11-14 ASSESSMENT — PAIN - FUNCTIONAL ASSESSMENT
PAIN_FUNCTIONAL_ASSESSMENT: 0-10

## 2024-11-14 ASSESSMENT — COLUMBIA-SUICIDE SEVERITY RATING SCALE - C-SSRS
2. HAVE YOU ACTUALLY HAD ANY THOUGHTS OF KILLING YOURSELF?: NO
1. IN THE PAST MONTH, HAVE YOU WISHED YOU WERE DEAD OR WISHED YOU COULD GO TO SLEEP AND NOT WAKE UP?: NO
6. HAVE YOU EVER DONE ANYTHING, STARTED TO DO ANYTHING, OR PREPARED TO DO ANYTHING TO END YOUR LIFE?: NO

## 2024-11-14 NOTE — ANESTHESIA PROCEDURE NOTES
Airway  Date/Time: 11/14/2024 2:47 PM  Urgency: elective    Airway not difficult    Staffing  Performed: CRNA   Authorized by: Merlin Robbins MD    Performed by: FE Bernardo  Patient location during procedure: OR    Indications and Patient Condition  Indications for airway management: anesthesia  Spontaneous ventilation: present  Sedation level: deep  Preoxygenated: yes  Patient position: sniffing  MILS maintained throughout  Mask difficulty assessment: 1 - vent by mask    Final Airway Details  Final airway type: supraglottic airway      Successful airway: Size 4     Number of attempts at approach: 1

## 2024-11-14 NOTE — ANESTHESIA PREPROCEDURE EVALUATION
Patient: Jaswinder Keenan    Procedure Information       Date/Time: 11/14/24 1400    Procedures:       Lithotripsy Extracorporeal Shock Wave (Right)      Cystoscopy with Removal Stent Ureter ( Holmium Laser, RT ESWL and STENT REMOVAL, REP: CAROLANN SANTANA 2G ) (Right)    Location: [facility] OR [facility] / Virtual [facility]    Surgeons: Kevin Peguero MD          Past Medical History:   Diagnosis Date    BPH (benign prostatic hyperplasia)     Coronary artery disease     Hyperlipidemia     Hypertension     Hypothyroidism     NSTEMI (non-ST elevated myocardial infarction) (Multi) 05/2015       Relevant Problems   Cardiac   (+) Aneurysm of infrarenal abdominal aorta (CMS-HCC)   (+) CAD (coronary artery disease)   (+) Hypertension      /Renal   (+) BPH (benign prostatic hyperplasia)   (+) Nephrolithiasis   (+) UTI (urinary tract infection)      ID   (+) UTI (urinary tract infection)     Past Surgical History:   Procedure Laterality Date    HERNIA REPAIR      OTHER SURGICAL HISTORY Left     leg and ankle surgery       Clinical information reviewed:   Tobacco  Allergies  Meds   Med Hx  Surg Hx   Fam Hx  Soc Hx        NPO Detail:  NPO/Void Status  Date of Last Liquid: 11/14/24  Time of Last Liquid: 0700 (water with meds)  Date of Last Solid: 11/13/24  Time of Last Solid: 2000  Time of Last Void: 1130         Physical Exam    Airway  Mallampati: I  TM distance: >3 FB  Neck ROM: full     Cardiovascular   Comments: deferred   Dental    Pulmonary   Comments: deferred   Abdominal     Comments: deferred           Anesthesia Plan    History of general anesthesia?: yes  History of complications of general anesthesia?: no    ASA 3     general     The patient is a current smoker.  Patient was previously instructed to abstain from smoking on day of procedure.  Patient did not smoke on day of procedure.  Education provided regarding risk of obstructive sleep apnea.  intravenous induction   Postoperative administration of opioids is  intended.  Anesthetic plan and risks discussed with patient.    Plan discussed with CRNA.

## 2024-11-14 NOTE — OP NOTE
Lithotripsy Extracorporeal Shock Wave (R) Operative Note     Date: 2024  OR Location: [facility]    Name: Jaswinder Keenan, : 1945, Age: 79 y.o., MRN: [id number], Sex: male    Diagnosis  Pre-op Diagnosis      * Calculus of kidney [N20.0] Post-op Diagnosis     * Calculus of kidney [N20.0]     Procedures  Lithotripsy Extracorporeal Shock Wave  88932 - [place] LITHOTRIPSY XTRCORP SHOCK WAVE      Surgeons      * Kevin Peguero - Primary    Resident/Fellow/Other Assistant:  Surgeons and Role:  * No surgeons found with a matching role *    Staff:   Circulator: Deb Bates Circulator: Marce    Anesthesia Staff: Anesthesiologist: Merlin Robbins MD  CRNA: KRISTOFER Bernardo-CRNA    Procedure Summary  Anesthesia: General  ASA: III  Estimated Blood Loss: 0 mL  Intra-op Medications:   Administrations occurring from 1400 to 1525 on 24:   Medication Name Total Dose   fentaNYL PF 0.05 mg/mL 50 mcg   LR infusion Cannot be calculated   lidocaine PF (Xylocaine-MPF) 1 % 5 mL   propofol (Diprivan) injection 10 mg/mL 150 mg   ceFAZolin (Ancef) 2 g in dextrose (iso)  mL 2 g              Anesthesia Record               Intraprocedure I/O Totals       None           Specimen: No specimens collected              Drains and/or Catheters: * None in log *    Tourniquet Times:         Implants:     Findings: Multiple gallstones.  Stone in the renal pelvis.  This was well fragmented.    Indications: Jaswinder Keenan is an 79 y.o. male who is having surgery for RT KIDNEY STONE N20.0.     The patient was seen in the preoperative area. The risks, benefits, complications, treatment options, non-operative alternatives, expected recovery and outcomes were discussed with the patient. The possibilities of reaction to medication, pulmonary aspiration, injury to surrounding structures, bleeding, recurrent infection, the need for additional procedures, failure to diagnose a condition, and creating a complication requiring  transfusion or operation were discussed with the patient. The patient concurred with the proposed plan, giving informed consent.  The site of surgery was properly noted/marked if necessary per policy. The patient has been actively warmed in preoperative area. Preoperative antibiotics have been ordered and given within 1 hours of incision. Venous thrombosis prophylaxis have been ordered including bilateral sequential compression devices    Procedure Details: Patient was taken the operating room placed under general anesthesia.  He was placed in appropriate position on the ESWL table.  2500 shocks were delivered to the stone.  There appeared to be excellent fragmentation.  At the conclusion of the procedure the patient was awakened and taken to the recovery room in stable condition.  Complications:  None; patient tolerated the procedure well.    Disposition: PACU - hemodynamically stable.  Condition: stable                 Additional Details:       Attending Attestation: I performed the procedure.    Kevin Peguero  Phone Number: [phone]

## 2024-11-14 NOTE — ANESTHESIA POSTPROCEDURE EVALUATION
Patient: Jaswinder Keenan    Procedure Summary       Date: 11/14/24 Room / Location: [facility] / [facility]    Anesthesia Start: 1441 Anesthesia Stop: 1529    Procedure: Lithotripsy Extracorporeal Shock Wave (Right) Diagnosis:       Calculus of kidney      (RT KIDNEY STONE N20.0)    Surgeons: Kevin Peguero MD Responsible Provider: Merlin Robbins MD    Anesthesia Type: general ASA Status: 3            Anesthesia Type: general    Vitals Value Taken Time   /79 11/14/24 1542   Temp 36.2 °C (97.2 °F) 11/14/24 1542   Pulse 63 11/14/24 1542   Resp 16 11/14/24 1542   SpO2 98 % 11/14/24 1542       Anesthesia Post Evaluation    Patient location during evaluation: PACU  Patient participation: complete - patient participated  Level of consciousness: awake and alert  Pain score: 0  Pain management: adequate  Multimodal analgesia pain management approach  Airway patency: patent  Two or more strategies used to mitigate risk of obstructive sleep apnea  Cardiovascular status: acceptable and blood pressure returned to baseline  Respiratory status: acceptable  Hydration status: acceptable  Postoperative Nausea and Vomiting: none        There were no known notable events for this encounter.

## 2024-11-14 NOTE — DISCHARGE INSTRUCTIONS
Expect blood in the urine  Expect a bruise in the right flank  Expect frequent urination      In case of emergency call the office at [phone].    If unable to reach the office call [phone].    Because you have had anesthesia you should not drive nor engage in any heavy activity for 24 hours.    If there are any signs of infection such as fever >101.5, redness or pus from an incision, or unexpected burning with urination, contact the doctor at [phone].    If you have pain beyond what is expected for the procedure call the doctor at [phone]

## 2024-11-18 ASSESSMENT — PAIN SCALES - GENERAL: PAINLEVEL_OUTOF10: 0 - NO PAIN

## 2024-11-18 NOTE — NURSING NOTE
Post op call 11/18-pt says after exercising small amount of there was a little blood in urine, told him that was relatively normal and take it easy on exercise

## 2024-12-06 ENCOUNTER — HOSPITAL ENCOUNTER (OUTPATIENT)
Dept: RADIOLOGY | Facility: HOSPITAL | Age: 79
Discharge: HOME | End: 2024-12-06
Payer: MEDICARE

## 2024-12-06 ENCOUNTER — LAB REQUISITION (OUTPATIENT)
Dept: LAB | Facility: HOSPITAL | Age: 79
End: 2024-12-06
Payer: MEDICARE

## 2024-12-06 DIAGNOSIS — N20.1 CALCULUS OF URETER: ICD-10-CM

## 2024-12-06 DIAGNOSIS — N20.0 CALCULUS OF KIDNEY: ICD-10-CM

## 2024-12-06 PROCEDURE — 74018 RADEX ABDOMEN 1 VIEW: CPT

## 2024-12-06 PROCEDURE — 87086 URINE CULTURE/COLONY COUNT: CPT

## 2024-12-08 LAB — BACTERIA UR CULT: NORMAL

## (undated) DEVICE — GOWN, SURGICAL, SIRUS, NON REINFORCED, LARGE

## (undated) DEVICE — SYSTEM, EASY CATCHER, DISP, NON-STERILE

## (undated) DEVICE — GUIDEWIRE, SOLO FLEX HYBRID, .035 STRAIGHT TIP

## (undated) DEVICE — Device

## (undated) DEVICE — IRRIGATION SET, CYSTOSCOPY, F/CONSTANT/INTERMITTENT, 8 GTT/CC, 77 IN

## (undated) DEVICE — BLANKET, LOWER BODY, VHA PLUS, ADULT

## (undated) DEVICE — SOLUTION, IRRIGATION, USP, STERILE WATER, UROLOGICAL, 3000 ML, BAG

## (undated) DEVICE — WATER STERILE, FOR IRRIGATION, 1000ML, W/HANGER

## (undated) DEVICE — SOLUTION, IRRIGATION, USP, SODIUM CHLORIDE 0.9%, 3000 ML, BAG

## (undated) DEVICE — GLOVE, SURGICAL, PROTEXIS PI MICRO, 7.0, PF, LF